# Patient Record
Sex: MALE | Race: WHITE | NOT HISPANIC OR LATINO | Employment: FULL TIME | URBAN - METROPOLITAN AREA
[De-identification: names, ages, dates, MRNs, and addresses within clinical notes are randomized per-mention and may not be internally consistent; named-entity substitution may affect disease eponyms.]

---

## 2017-01-12 ENCOUNTER — ALLSCRIPTS OFFICE VISIT (OUTPATIENT)
Dept: OTHER | Facility: OTHER | Age: 62
End: 2017-01-12

## 2017-01-14 LAB
A/G RATIO (HISTORICAL): 1.6 (ref 1.1–2.5)
ALBUMIN SERPL BCP-MCNC: 4.4 G/DL (ref 3.6–4.8)
ALP SERPL-CCNC: 72 IU/L (ref 39–117)
ALT SERPL W P-5'-P-CCNC: 36 IU/L (ref 0–44)
AST SERPL W P-5'-P-CCNC: 35 IU/L (ref 0–40)
BILIRUB SERPL-MCNC: 0.3 MG/DL (ref 0–1.2)
BUN SERPL-MCNC: 15 MG/DL (ref 8–27)
BUN/CREA RATIO (HISTORICAL): 17 (ref 10–22)
CALCIUM SERPL-MCNC: 9.4 MG/DL (ref 8.6–10.2)
CHLORIDE SERPL-SCNC: 100 MMOL/L (ref 96–106)
CHOLEST SERPL-MCNC: 254 MG/DL (ref 100–199)
CO2 SERPL-SCNC: 22 MMOL/L (ref 18–29)
CREAT SERPL-MCNC: 0.89 MG/DL (ref 0.76–1.27)
DEPRECATED RDW RBC AUTO: 13.4 % (ref 12.3–15.4)
EGFR AFRICAN AMERICAN (HISTORICAL): 106 ML/MIN/1.73
EGFR-AMERICAN CALC (HISTORICAL): 92 ML/MIN/1.73
GLUCOSE SERPL-MCNC: 90 MG/DL (ref 65–99)
HCT VFR BLD AUTO: 41.8 % (ref 37.5–51)
HDLC SERPL-MCNC: 33 MG/DL
HGB BLD-MCNC: 15 G/DL (ref 12.6–17.7)
LDLC SERPL CALC-MCNC: ABNORMAL MG/DL (ref 0–99)
MCH RBC QN AUTO: 33.8 PG (ref 26.6–33)
MCHC RBC AUTO-ENTMCNC: 35.9 G/DL (ref 31.5–35.7)
MCV RBC AUTO: 94 FL (ref 79–97)
POTASSIUM SERPL-SCNC: 4.5 MMOL/L (ref 3.5–5.2)
RBC (HISTORICAL): 4.44 X10E6/UL (ref 4.14–5.8)
SODIUM SERPL-SCNC: 140 MMOL/L (ref 134–144)
TOT. GLOBULIN, SERUM (HISTORICAL): 2.7 G/DL (ref 1.5–4.5)
TOTAL PROTEIN (HISTORICAL): 7.1 G/DL (ref 6–8.5)
TRIGL SERPL-MCNC: 677 MG/DL (ref 0–149)
VLDLC SERPL CALC-MCNC: ABNORMAL MG/DL (ref 5–40)
WBC # BLD AUTO: 7.2 X10E3/UL (ref 3.4–10.8)

## 2017-02-28 ENCOUNTER — GENERIC CONVERSION - ENCOUNTER (OUTPATIENT)
Dept: OTHER | Facility: OTHER | Age: 62
End: 2017-02-28

## 2018-01-02 ENCOUNTER — ALLSCRIPTS OFFICE VISIT (OUTPATIENT)
Dept: OTHER | Facility: OTHER | Age: 63
End: 2018-01-02

## 2018-01-03 NOTE — PROGRESS NOTES
Assessment   1  Acute maxillary sinusitis, recurrence not specified (461 0) (J01 00)   2  Left shoulder pain (719 41) (M25 512)    Plan   Acute maxillary sinusitis, recurrence not specified    · Azithromycin 250 MG Oral Tablet; TAKE AS DIRECTED PER PACKAGE    INSTRUCTIONS   · Promethazine-DM 6 25-15 MG/5ML Oral Syrup; TAKE 5 ML EVERY 4 TO 6 HOURS    AS NEEDED FOR COUGH  Left shoulder pain    · *1 - [facility]  *  Status: Active  Requested for:    23ZRQ4374  Care Summary provided  : Yes    Discussion/Summary      In summary then this is a 79-year-old male who presents today with what appears to be acute influenza of 5 days duration  We discussed that at this point Tamiflu is not likely to be of great affect  He does appear to be developing a left maxillary/ethmoidal sinusitis possibly is secondary infection  Going to give him some azithromycin to try  He is also given some promethazine DM and push fluids and rest  He is asked to call if symptoms are not improving in 2-3 days or sooner should they deteriorate  He agrees  He does have chronic left shoulder pain which had believe represents a left rotator cuff tendinitis/arthropathy We are going to ask Orthopedic surgery to see him in to evaluate this  He agrees  Chief Complaint   I have 5 days of cold  Flu like sx over the weekend  Green nasal d/c as well as sputum  Mild facial pressure  Coughing and hacking all night  Little wheeze  No N/V/D  Diminished appetite  No myalgias  Also my left shoulder continues to hurt since that time I had checked before  I have a hard time raising it overhead  History of Present Illness   HPI: The patient is a 79-year-old male who states that approximately 5 days ago he developed upper respiratory symptoms which developed into headaches muscle aches and coughing and hacking all night  He has had very little wheeze and no shortness of breath  No exertional chest pain  No edema   He has had no nausea vomiting or diarrhea  He does have somewhat diminished appetite though he continues with fluids and normal urination  He has had some green nasal discharge as well as expectorated green postnasal drip  His wife develops similar symptoms 2 days after he did  He also has a history of left rotator cuff injury and continues to have discomfort as well as diminished range of motion  Sinusitis (Brief): The patient is being seen for an initial evaluation of sinusitis  The sinusitis involves the maxillary sinuses and the ethmoid sinuses  The sinusitis is classified as acute  The patient is currently experiencing symptoms  facial pressure headache nasal congestion purulent rhinorrhea postnasal drainage      Associated symptoms:  fever,-- chills,-- nausea,-- sore throat-- and-- cough, but-- no ear fullness-- and-- no ear pressure  Review of Systems        Constitutional: chills-- and-- feeling tired  ENT: sore throat-- and-- nasal discharge, but-- no earache  Cardiovascular: no chest pain-- and-- no lower extremity edema  Respiratory: cough-- and-- wheezing, but-- no shortness of breath,-- no orthopnea-- and-- no PND  Gastrointestinal: no vomiting-- and-- no diarrhea  Neurological: headache, but-- no dizziness  Active Problems   1  Allergic rhinitis (477 9) (J30 9)   2  Colon cancer screening (V76 51) (Z12 11)   3  Colonoscopy (Fiberoptic) Screening   4  Dermatitis (692 9) (L30 9)   5  Erectile dysfunction of non-organic origin (302 72) (F52 21)   6  Hypertriglyceridemia (272 1) (E78 1)   7  Influenza vaccine needed (V04 81) (Z23)   8  Left shoulder pain (719 41) (M25 512)   9  Right Rotator Cuff Sprain (Capsule) (840 4)    Past Medical History   1  History of Acute bronchitis (466 0) (J20 9)   2  History of atopic dermatitis (V13 3) (Z87 2)   3  History of viral infection (V12 09) (Z86 19)    Social History    · Never A Smoker    Current Meds    1   Mometasone Furoate 0 1 % External Solution; APPLY ONLY TO SCALP LESIONS AND     RUB IN GENTLY AND COMPLETELY  PREVENT SOLUTION FROM SPREADING ONTO     FOREHEAD; Therapy: 51QKR6357 to (Evaluate:90Bwt6950)  Requested for: 72YIJ4132; Last     Rx:12Jan2017 Ordered   2  Viagra 100 MG Oral Tablet; TAKE 1 TABLET DAILY 1 HOUR BEFORE NEEDED; Therapy: 54NYN8571 to (Evaluate:24Jan2017)  Requested for: 12LTS9081; Last     Rx:12Jan2017 Ordered    Allergies   1  No Known Drug Allergies    Vitals    Recorded: 02Jan2018 11:43AM   Temperature 71 5 F   Systolic 691   Diastolic 72   Height 5 ft 10 5 in   Weight 196 lb 8 oz   BMI Calculated 27 8   BSA Calculated 2 08     Physical Exam        Constitutional      General appearance: Abnormal  -- Overweight and fatigued appearing  Eyes      Conjunctiva and lids: No swelling, erythema, or discharge  Ears, Nose, Mouth, and Throat      External inspection of ears and nose: Abnormal  -- Tender left maxilla ethmoidal region  Otoscopic examination: Tympanic membrance translucent with normal light reflex  Canals patent without erythema  Nasal mucosa, septum, and turbinates: Abnormal  -- Red and boggy with thick yellow-green nasal discharge on the left  Oropharynx: Normal with no erythema, edema, exudate or lesions  -- Negative ulcer exudate or lesion  Mucosa is moist       Cardiovascular      Auscultation of heart: Normal rate and rhythm, normal S1 and S2, without murmurs  -- Regular rhythm with a rate in the 70s  Examination of extremities for edema and/or varicosities: Normal  -- No edema  Lymphatic      Palpation of lymph nodes in neck: No lymphadenopathy  -- No JVD or adenopathy  Musculoskeletal      Digits and nails: Normal without clubbing or cyanosis  -- No clubbing cyanosis or edema  Inspection/palpation of joints, bones, and muscles: Abnormal  -- Cannot abduct left shoulder past 90Â°        Psychiatric      Orientation to person, place and time: Normal        Mood and affect: Normal           Signatures    Electronically signed by : Daun Spatz, M D ; Jan 2 2018 12:39PM EST                       (Author)

## 2018-01-10 DIAGNOSIS — M75.122 COMPLETE ROTATOR CUFF TEAR OF LEFT SHOULDER: ICD-10-CM

## 2018-01-10 DIAGNOSIS — M25.512 PAIN IN LEFT SHOULDER: ICD-10-CM

## 2018-01-10 NOTE — PROGRESS NOTES
Assessment    1  Encounter for preventive health examination (V70 0) (Z00 00)   2  Erectile dysfunction of non-organic origin (302 72) (F52 21)   3  Dermatitis (692 9) (L30 9)    Plan  Dermatitis    · Mometasone Furoate 0 1 % External Solution; APPLY ONLY TO SCALP  LESIONS AND RUB IN GENTLY AND COMPLETELY  PREVENT SOLUTION FROM  SPREADING ONTO FOREHEAD  Erectile dysfunction of non-organic origin    · Viagra 100 MG Oral Tablet; TAKE 1 TABLET DAILY 1 HOUR BEFORE NEEDED  Influenza vaccine needed    · Fluzone Quadrivalent 0 5 ML Intramuscular Suspension  Tick bite    · Doxycycline Hyclate 100 MG Oral Capsule    Discussion/Summary  Impression: health maintenance visit  Currently, he eats an adequate diet and has an inadequate exercise regimen  Prostate cancer screening: the risks and benefits of prostate cancer screening were discussed and the patient declines PSA testing  Testicular cancer screening: the risks and benefits of testicular cancer screening were discussed and clinical testicular exam was done today  Colorectal cancer screening: the risks and benefits of colorectal cancer screening were discussed and colonoscopy has been ordered  Screening lab work includes glucose and lipid profile  The risks and benefits of immunizations were discussed and He received a flu shot today but he declines DTaP  Advice and education were given regarding nutrition and aerobic exercise  Patient discussion: discussed with the patient  In summary then this is a 25-year-old male for health maintenance visit  He's not had blood work for many years  He did not have it performed after his last health maintenance visit as requested  Though he is not fasting today we decided to get CBC CMP and lipids at least as a baseline and can follow from there  He agrees  He received an influenza vaccine today but declines DTaP  His examination is essentially normal today  Blood pressure is well controlled   He is approximately 15 pounds above ideal weight and is asked to work on this   He agrees  We'll follow-up after blood work results are available  He's instructed that he needs colorectal screening  Mometasone lotion is refilled for dermatitis  Viagra is refilled for erectile dysfunction  Chief Complaint  My psoriasis comes and goes and I need a Viagra RF  History of Present Illness  HM, Adult Male: The patient is being seen for a health maintenance evaluation  Social History: Household members include spouse  He is   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  The patient has never smoked cigarettes  He reports drinking 1 drinks per day  He has never used illicit drugs  General Health: He has regular dental visits  He denies vision problems  Vision care includes wearing glasses  He has hearing loss  Immunizations status:  Needs Dtap  Lifestyle:  He consumes a diverse and healthy diet  He does not have any weight concerns  He exercises regularly  Exercise includes walking  He consumes alcohol  He denies drug use  Reproductive health:  He complains of erectile dysfunction  Needs Viagra RF  Screening: Prostate cancer screening includes no previous prostate-specific antigen testing  Colorectal cancer screening includes no previous screening  Review of Systems    Constitutional: no recent weight gain and no recent weight loss  Cardiovascular: no chest pain, no palpitations and no extremity edema  Respiratory: no shortness of breath, no cough, no wheezing and no shortness of breath during exertion  Gastrointestinal: no constipation, no diarrhea and no blood in stools  Genitourinary: no urinary hesitancy, no incontinence and no nocturia  Integumentary: a rash and Psoriasis, but no skin lesions  Neurological: no headache and no dizziness  Psychiatric: no anxiety and no depression  Active Problems    1  Allergic rhinitis (267 9) (J30 9)   2  Colonoscopy (Fiberoptic) Screening   3  Dermatitis (692 9) (L30 9)   4  Erectile dysfunction of non-organic origin (302 72) (F52 21)   5  Influenza vaccine needed (V04 81) (Z23)   6  Left shoulder pain (719 41) (M25 512)   7  Right Rotator Cuff Sprain (Capsule) (840 4)   8  Tick bite (919 4,E906 4) (W57 XXXA)    Past Medical History    · History of Acute bronchitis (466 0) (J20 9)   · History of atopic dermatitis (V13 3) (Z87 2)   · History of viral infection (V12 09) (Z86 19)    Social History    · Never A Smoker    Current Meds   1  Doxycycline Hyclate 100 MG Oral Capsule; TAKE 1 CAPSULE EVERY 12 HOURS UNTIL   GONE;   Therapy: 85YQW9804 to (Ruthy Whitman)  Requested for: 73MQI6756; Last   Rx:18Mar2016 Ordered   2  Mometasone Furoate 0 1 % External Solution; APPLY ONLY TO SCALP LESIONS AND   RUB IN GENTLY AND COMPLETELY  PREVENT SOLUTION FROM SPREADING ONTO   FOREHEAD; Therapy: 64JAO7552 to (IFKFFITU:45XOC2168)  Requested for: 54AUS2785; Last   Rx:03Oct2016 Ordered   3  Viagra 100 MG Oral Tablet; TAKE 1 TABLET DAILY 1 HOUR BEFORE NEEDED; Therapy: 90IEQ9762 to 0761 30 00 40)  Requested for: 09UCD9665; Last   Rx:64Dqi6953 Ordered    Allergies    1  No Known Drug Allergies    Vitals   Recorded: 62BGM0058 37:87BL   Systolic 788   Diastolic 62   Height 5 ft 10 5 in   Weight 200 lb    BMI Calculated 28 29   BSA Calculated 2 1     Physical Exam    Constitutional   General appearance: No acute distress, well appearing and well nourished  Eyes   Conjunctiva and lids: No erythema, swelling or discharge  Ears, Nose, Mouth, and Throat   Otoscopic examination: Tympanic membranes translucent with normal light reflex  Canals patent without erythema  Lips, teeth, and gums: Normal, good dentition  Oropharynx: Normal with no erythema, edema, exudate or lesions  Neck   Neck: Supple, symmetric, trachea midline, no masses  Thyroid: Normal, no thyromegaly      Pulmonary   Respiratory effort: No increased work of breathing or signs of respiratory distress  Auscultation of lungs: Clear to auscultation  Cardiovascular   Auscultation of heart: Normal rate and rhythm, normal S1 and S2, no murmurs  The heart rate was normal  The rhythm was regular  Heart sounds: normal S1, normal S2 and no gallop heard  no murmurs were heard  Carotid pulses: 2+ bilaterally  right 2+, no bruit heard over the right carotid, left 2+ and no bruit heard over the left carotid  Examination of extremities for edema and/or varicosities: Normal     Abdomen   Abdomen: Non-tender, no masses  Liver and spleen: No hepatomegaly or splenomegaly  Examination for hernias: No hernias appreciated  Anus, perineum, and rectum: Normal sphincter tone, no masses, no prolapse  Genitourinary   Scrotal contents: Normal testes, no masses  Scrotum findings: normal  Testes: no tenderness, no atrophy and no masses  Penis: Normal, no lesions  Digital rectal exam of prostate: Normal size, no masses  The prostate was not enlarged and had no palpable nodules  Lymphatic   Palpation of lymph nodes in neck: No lymphadenopathy  Skin   Skin and subcutaneous tissue: Normal without rashes or lesions      Psychiatric   Judgment and insight: Normal     Orientation to person, place and time: Normal     Mood and affect: Normal        Signatures   Electronically signed by : CARLITOS Cohen ; Jan 12 2017  6:11PM EST                       (Author)

## 2018-01-11 ENCOUNTER — APPOINTMENT (OUTPATIENT)
Dept: RADIOLOGY | Facility: CLINIC | Age: 63
End: 2018-01-11
Payer: COMMERCIAL

## 2018-01-11 ENCOUNTER — ALLSCRIPTS OFFICE VISIT (OUTPATIENT)
Dept: OTHER | Facility: OTHER | Age: 63
End: 2018-01-11

## 2018-01-11 DIAGNOSIS — M25.512 PAIN IN LEFT SHOULDER: ICD-10-CM

## 2018-01-11 PROCEDURE — 73030 X-RAY EXAM OF SHOULDER: CPT

## 2018-01-12 NOTE — PROGRESS NOTES
Assessment   1  Complete tear of left rotator cuff (725 47) (W54 585)    Plan   Complete tear of left rotator cuff    · Follow-up After MRI Evaluation and Treatment  Follow-up  Status: Hold For - Scheduling     Requested for: 68CTD4137   · * MRI SHOULDER LEFT WO CONTRAST; Status:Need Information - Financial    Authorization; Requested UF:[id number];    · *1 - [facility]  * evaluate and treat with aggressive    stretching, scapular stabilization and other appropriate modalities  No restrictions     Status: Complete  Done: 43UCJ1337  Care Summary provided  : Yes  Left shoulder pain    · * XR SHOULDER 2+ VIEW LEFT; Status:Active - Retrospective By Protocol Authorization; Requested NUB:[id number]; Discussion/Summary      60-year-old male with what appears to be a massive tear of the left rotator cuff  I would like to get an MRI to better assess the tear, the level of retraction the ability to fix it  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  We will also get him going with physical therapy to help with range of motion  I will see him back after the MRI  documentation was recorded using voice recognition software      Chief Complaint   Unable to use left shoulder and arm      History of Present Illness   60-year-old male here today for evaluation of his left shoulder  He has been having problems in the shoulder for the last 4-5 years, though over the last few weeks it is significantly worse  He has been given anti-inflammatories in the past which helped a little bit  The shoulder feels like it is stiff knee now up he has a lot more difficulty getting the hand up over his head  He now has to use his other arm to get the hand up above his head  If he showering he leans his elbow on the shower wall to wash his hair  Two weeks ago he was lifting his arm up and felt a pop sensation in the shoulder and since that time it has been worse   He has a constant aching pain in the shoulder  He has difficulty talking his shirt in an pulling his pants up  He notes weakness and instability  He has not done any therapy at this time  He has not had any injections  medical intake form was reviewed      Review of Systems        Constitutional: No fever or chills, feels well, no tiredness, no recent weight loss or weight gain  Eyes: No complaints of red eyes, no eyesight problems  ENT: no complaints of loss of hearing, no nosebleeds, no sore throat  Cardiovascular: No complaints of chest pain, no palpitations, no leg claudication or lower extremity edema  Respiratory: No complaints of shortness of breath, no wheezing, no cough  Gastrointestinal: No complaints of abdominal pain, no constipation, no nausea or vomiting, no diarrhea or bloody stools  Genitourinary: No complaints of dysuria or incontinence, no hesitancy, no nocturia  Musculoskeletal: limb pain, but-- as noted in HPI  Integumentary: No complaints of skin rash or lesion, no itching or dry skin, no skin wounds  Neurological: No complaints of headache, no confusion, no numbness or tingling, no dizziness  Psychiatric: No suicidal thoughts, no anxiety, no depression  Endocrine: No muscle weakness, no frequent urination, no excessive thirst, no feelings of weakness  ROS reviewed  Active Problems   1  Acute maxillary sinusitis, recurrence not specified (461 0) (J01 00)   2  Allergic rhinitis (477 9) (J30 9)   3  Colon cancer screening (V76 51) (Z12 11)   4  Colonoscopy (Fiberoptic) Screening   5  Dermatitis (692 9) (L30 9)   6  Erectile dysfunction of non-organic origin (302 72) (F52 21)   7  Hypertriglyceridemia (272 1) (E78 1)   8  Influenza vaccine needed (V04 81) (Z23)   9  Left shoulder pain (719 41) (M25 512)   10   Right Rotator Cuff Sprain (Capsule) (840 4)    Past Medical History    · History of Acute bronchitis (466 0) (J20 9)   · History of atopic dermatitis (V13 3) (Z87 2)   · History of viral infection (V12 09) (Z86 19)     The active problems and past medical history were reviewed and updated today  Surgical History      The surgical history was reviewed and updated today  Family History      The family history was reviewed and updated today  Social History    · Never A Smoker  The social history was reviewed and updated today  Current Meds    1  Azithromycin 250 MG Oral Tablet; TAKE AS DIRECTED PER PACKAGE     INSTRUCTIONS; Therapy: 60DZX2284 to (Last Rx:02Jan2018)  Requested for: 02Jan2018 Ordered   2  Mometasone Furoate 0 1 % External Solution; APPLY ONLY TO SCALP LESIONS AND     RUB IN GENTLY AND COMPLETELY  PREVENT SOLUTION FROM SPREADING ONTO     FOREHEAD; Therapy: 91QVA2976 to (Evaluate:66Vqe9931)  Requested for: 64FZN9678; Last     Rx:12Jan2017 Ordered   3  Promethazine-DM 6 25-15 MG/5ML Oral Syrup; TAKE 5 ML EVERY 4 TO 6 HOURS AS     NEEDED FOR COUGH; Therapy: 78BDA4374 to (Evaluate:06Jan2018)  Requested for: 52GEP7095; Last     Rx:02Jan2018 Ordered   4  Viagra 100 MG Oral Tablet; TAKE 1 TABLET DAILY 1 HOUR BEFORE NEEDED; Therapy: 78PVO1284 to (Evaluate:24Jan2017)  Requested for: 66IJC5597; Last     Rx:12Jan2017 Ordered     The medication list was reviewed and updated today  Allergies   1  No Known Drug Allergies    Vitals    Recorded: 37XNA2943 02:37PM   Heart Rate 84   Systolic 617   Diastolic 82   Height 5 ft 10 5 in   Weight 196 lb    BMI Calculated 27 73   BSA Calculated 2 08     Physical Exam      subacromial bursa Forward flexion: painful restricted AROM 80 degrees and restricted PROM 150 degrees which was painful  External rotation: restricted AROM 40 degrees  Motor: Unable to perform subscap testing because patient cannot was arm in that position, 4/5 strength with external rotation and internal rotation   Special Tests: positive Painful Arc-- and-- positive Drop Arm test, but-- negative Speed's test-- and-- negative Cross Body Adduction test  Internal rotation by spine level: restricted AROM Upper lumbar versus middle thoracic degrees  Left Shoulder Appearance[de-identified] Normal No atrophy noted of the supraspinatus or infraspinatus muscles  Tenderness: AC joint-- and-- greater tuberosity  Right Shoulder:      Inspection and Palpation: Normal       ROM: Normal       Strength: Normal       Stability: Normal       Constitutional - General appearance: Normal       Musculoskeletal - Gait and station: Normal -- Upper extremity compartments: Normal       Cardiovascular - Pulses: Normal       Skin - Skin and subcutaneous tissue: Normal       Neurologic - Sensation: Normal -- Upper extremity peripheral neuro exam: Normal       Psychiatric - Orientation to person, place, and time: Normal -- Mood and affect: Normal       Eyes      Conjunctiva and lids: Normal        Results/Data   I personally reviewed the films/images/results in the office today  My interpretation follows  X-ray Review X-rays show some mild acromial clavicular joint arthritis as well as glenohumeral osteoarthritis  The humeral head is slightly high riding concerning for rotator cuff tear        Signatures    Electronically signed by : Randell Marcelino MD; Jan 11 2018  3:23PM EST                       (Author)

## 2018-01-13 VITALS
HEIGHT: 71 IN | WEIGHT: 200 LBS | SYSTOLIC BLOOD PRESSURE: 124 MMHG | BODY MASS INDEX: 28 KG/M2 | DIASTOLIC BLOOD PRESSURE: 62 MMHG

## 2018-01-13 NOTE — RESULT NOTES
Verified Results  (1) COMPREHENSIVE METABOLIC PANEL 92HEG7044 69:92LG [facility] [place] [facility]     Test Name Result Flag Reference   Glucose, Serum 90 mg/dL  65-99   BUN 15 mg/dL  8-27   Creatinine, Serum 0 89 mg/dL  0 76-1 27   eGFR If NonAfricn Am 92 mL/min/1 73  >59   eGFR If Africn Am 106 mL/min/1 73  >59   BUN/Creatinine Ratio 17  10-22   Sodium, Serum 140 mmol/L  134-144   Potassium, Serum 4 5 mmol/L  3 5-5 2   Chloride, Serum 100 mmol/L     Carbon Dioxide, Total 22 mmol/L  18-29   Calcium, Serum 9 4 mg/dL  8 6-10 2   Protein, Total, Serum 7 1 g/dL  6 0-8 5   Albumin, Serum 4 4 g/dL  3 6-4 8   Globulin, Total 2 7 g/dL  1 5-4 5   A/G Ratio 1 6  1 1-2 5   Bilirubin, Total 0 3 mg/dL  0 0-1 2   Sample is lipemic  This may cause spurious increases in TBili, DBili,  AST, ALT, and UIBC (if ordered)  Clinical correlation indicated  Alkaline Phosphatase, S 72 IU/L     AST (SGOT) 35 IU/L  0-40   The specimen was lipemic  The lipemia was cleared by  ultracentrifugation before testing  However HDL, direct LDL,  cholesterol and triglyceride (if ordered) were performed prior to  ultracentrifugation  ALT (SGPT) 36 IU/L  0-44   The specimen was lipemic  The lipemia was cleared by  ultracentrifugation before testing  However HDL, direct LDL,  cholesterol and triglyceride (if ordered) were performed prior to  ultracentrifugation       (LC) CBC, No Differential/Platelet [id number] 53:79ZP [facility]     Test Name Result Flag Reference   WBC 7 2 x10E3/uL  3 4-10 8   RBC 4 44 x10E6/uL  4 14-5 80   Hemoglobin 15 0 g/dL  12 6-17 7   Hematocrit 41 8 %  37 5-51 0   MCV 94 fL  79-97   MCH 33 8 pg H 26 6-33 0   MCHC 35 9 g/dL H 31 5-35 7   RDW 13 4 %  12 3-15 4     () Lipid Panel 75RNP3076 12:00AM [facility]     Test Name Result Flag Reference   Cholesterol, Total 254 mg/dL H 100-199   Triglycerides 677 mg/dL HH 0-149   HDL Cholesterol 33 mg/dL L >39   VLDL Cholesterol [place] Comment mg/dL  5-40   The calculation for the VLDL cholesterol is not valid when  triglyceride level is >400 mg/dL  LDL Cholesterol Calc Comment mg/dL  0-99   Triglyceride result indicated is too high for an accurate LDL  cholesterol estimation  Plan  Hypertriglyceridemia    · (1) LIPID PANEL FASTING W DIRECT LDL REFLEX; Status:Active;  Requested  for:39Yeq6357;

## 2018-01-22 VITALS
HEART RATE: 84 BPM | WEIGHT: 196 LBS | DIASTOLIC BLOOD PRESSURE: 82 MMHG | HEIGHT: 71 IN | SYSTOLIC BLOOD PRESSURE: 128 MMHG | BODY MASS INDEX: 27.44 KG/M2

## 2018-01-23 VITALS
HEIGHT: 71 IN | DIASTOLIC BLOOD PRESSURE: 72 MMHG | SYSTOLIC BLOOD PRESSURE: 138 MMHG | BODY MASS INDEX: 27.51 KG/M2 | TEMPERATURE: 99.6 F | WEIGHT: 196.5 LBS

## 2018-01-26 ENCOUNTER — EVALUATION (OUTPATIENT)
Dept: PHYSICAL THERAPY | Facility: CLINIC | Age: 63
End: 2018-01-26
Payer: COMMERCIAL

## 2018-01-26 DIAGNOSIS — M75.122 COMPLETE TEAR OF LEFT ROTATOR CUFF: Primary | ICD-10-CM

## 2018-01-26 DIAGNOSIS — M75.122 COMPLETE ROTATOR CUFF TEAR OF LEFT SHOULDER: ICD-10-CM

## 2018-01-26 PROCEDURE — 97161 PT EVAL LOW COMPLEX 20 MIN: CPT

## 2018-01-26 PROCEDURE — 97140 MANUAL THERAPY 1/> REGIONS: CPT

## 2018-01-26 PROCEDURE — G8984 CARRY CURRENT STATUS: HCPCS

## 2018-01-26 PROCEDURE — G8985 CARRY GOAL STATUS: HCPCS

## 2018-01-26 RX ORDER — MOMETASONE FUROATE 1 MG/G
OINTMENT TOPICAL DAILY
COMMUNITY
End: 2019-06-24 | Stop reason: SDUPTHER

## 2018-01-26 RX ORDER — AZITHROMYCIN 250 MG/1
250 TABLET, FILM COATED ORAL EVERY 24 HOURS
COMMUNITY
End: 2018-05-24 | Stop reason: ALTCHOICE

## 2018-01-26 RX ORDER — SILDENAFIL 100 MG/1
100 TABLET, FILM COATED ORAL DAILY PRN
COMMUNITY
End: 2019-01-14 | Stop reason: ALTCHOICE

## 2018-01-26 RX ORDER — DEXTROMETHORPHAN HYDROBROMIDE AND PROMETHAZINE HYDROCHLORIDE 15; 6.25 MG/5ML; MG/5ML
SYRUP ORAL 4 TIMES DAILY PRN
COMMUNITY
End: 2018-05-24 | Stop reason: ALTCHOICE

## 2018-01-26 NOTE — PROGRESS NOTES
PT Evaluation     Today's date: 2018  Patient name: Adenike Acosta  : 1955  MRN: [id number]  Referring provider: Kasandra Vanegas MD  Dx:   Encounter Diagnosis   Name Primary?  Complete rotator cuff tear of left shoulder                   Assessment  Impairments: abnormal or restricted ROM, impaired physical strength and pain with function  Functional limitations: difficulty lifting equipment at shoulder level or overhead, difficulty dressing  Patient is irritable  Assessment details: Pt is a 60year old RHD male who presents to PT with complaints of chronic L shoulder pain, with suspicion of RC tear  He reports no pain at rest ,only mild ache with L arm use  He presents with severe weakness in RC musculature with decreased AROM and antalgic weakness on L side  He has a positive Drop Arm and positive Infraspinatus lag sign  He has moderate posterior and inferior capsule tightness in L shoulder compared to his R side  He will benefit from skilled PT to help decrease pain with daily activities, increase AROM and strength for L shoulder stabilizers and accessory musculature, and improve overall functioning  Thank you kindly for your referral    Barriers to therapy: high copay= $80  Understanding of Dx/Px/POC: excellent   Prognosis: fair  Prognosis details: Good pain tolerance, yet severe weakness in RC musculature, will require strict compliance with exercises for success  Goals  ST: Increase AROM elevation to 110-120 in 4 weeks  2:  Increase RC strength to 4 to 4+/5 throughout in 4-6 weeks  3: Decrease pain 2-3 grades on VAS in 4 weeks  LT: Pt able to lift 10-15 lbs overhead BL without pain/difficulty in 6 weeks  2: Pt independent with HEP      Plan  Patient would benefit from: skilled PTPlanned therapy interventions: manual therapy, joint mobilization, neuromuscular re-education, patient education, strengthening, therapeutic exercise, flexibility and home exercise program  Frequency: 1x week  Duration in visits: 6  Duration in weeks: 6  Treatment plan discussed with: patient  Plan details: Initiate PT as per POC        Subjective Evaluation    History of Present Illness  Date of onset: 2018  Mechanism of injury: Pain in shoulder for a couple years  Recently L shoulder pain radual onset, was in bed with flu, arm stiffened up from not moving for a week, saw PCP for sickness and addressed L shoulder    Saw Dr Kristi El, x-ray ordered, wants to follow up with MRI  Pain with dressing, lifting overhead  No sleep disturbance    Recurrent probem  Quality of life: good    Pain  Current pain ratin  At best pain ratin  At worst pain ratin  Location: anterior/lateral L shoulder  Quality: dull ache, throbbing and sharp  Relieving factors: rest  Aggravating factors: overhead activity  Progression: improved    Social Support    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, 7 days/week, other manual side jobs)  Hand dominance: right      Diagnostic Tests  X-ray: normal (mild degenerative changes to [facility] [place] joint)  Patient Goals  Patient goals for therapy: independence with ADLs/IADLs, decreased pain and increased motion          Objective     Palpation     Additional Palpation Details  No tenderness to palpation    Active Range of Motion   Left Shoulder   Flexion: 95 degrees   Abduction: 75 degrees   External rotation BTH: C6   Internal rotation BTB: L1     Right Shoulder   Flexion: 158 degrees   Abduction: 158 degrees   External rotation BTH: T1   Internal rotation BTB: T9     Additional Active Range of Motion Details  No significant pain, just tightness with L arm motion    Passive Range of Motion   Left Shoulder   Normal passive range of motion    Right Shoulder   Flexion: 160 degrees   Abduction: 145 degrees   External rotation 45°: 65 degrees   Internal rotation 45°: 30 degrees     Additional Passive Range of Motion Details  Significant posterior and inferior capsule tightness, no pain with joint mobilization    Strength/Myotome Testing     Left Shoulder     Planes of Motion   Flexion: 4-   Extension: 5   Abduction: 3+   External rotation at 0°: 3+   Internal rotation at 0°: 4+     Isolated Muscles   Biceps: 5   Triceps: 4+     Right Shoulder     Planes of Motion   Flexion: 4   Extension: 5   Abduction: 4   External rotation at 0°: 4-   Internal rotation at 0°: 4+     Isolated Muscles   Biceps: 5   Triceps: 4+     Additional Strength Details  L shoulder strength taken in available ranges    Tests     Left Shoulder   Positive drop arm, external rotation lag sign and painful arc  Negative crossover, Hawkin's and Neer's         Precautions: none    Daily Treatment Diary     Manual              L shoulder PROM             Post/inferior mob             Rhythmic stabs                                           Exercise Diary              Wand overhead 2# cuff             SH IR 3#             SA punches             Figure-8             SL ER             Prone extension             Prone abd                          TB row, ext             Standing scaption             Wall slides with lift-off                                                                                                                                      Modalities              CP post Loan 10 min

## 2018-01-31 ENCOUNTER — OFFICE VISIT (OUTPATIENT)
Dept: PHYSICAL THERAPY | Facility: CLINIC | Age: 63
End: 2018-01-31
Payer: COMMERCIAL

## 2018-01-31 DIAGNOSIS — M75.122 COMPLETE ROTATOR CUFF TEAR OF LEFT SHOULDER: Primary | ICD-10-CM

## 2018-01-31 DIAGNOSIS — M75.122 COMPLETE TEAR OF LEFT ROTATOR CUFF: ICD-10-CM

## 2018-01-31 PROCEDURE — 97110 THERAPEUTIC EXERCISES: CPT

## 2018-01-31 PROCEDURE — 97140 MANUAL THERAPY 1/> REGIONS: CPT

## 2018-01-31 NOTE — PROGRESS NOTES
Daily Note     Today's date: 2018  Patient name: Romulo Mendoza  : 1955  MRN: [id number]  Referring provider: Clay Frias MD  Dx:   Encounter Diagnoses   Name Primary?  Complete rotator cuff tear of left shoulder Yes    Complete tear of left rotator cuff                   Subjective: Pt reports no problems with isometrics from HEP, IR stretch with towel behind back is only one that makes it somewhat sore  Objective: See treatment diary below  Daily Treatment Diary     Manual              L shoulder PROM c            Post/inferior mob NP            Isometrics IR/ER in supine, scapular protraction/retracton in SL c                          20                Exercise Diary              Wand overhead 2# cuff 10x            SH IR 3# 2x10            SA punches 0# 2x10            Figure-8 0# 2x10            SL ER 0# 2x10            Prone extension NV            Prone abd NV                         TB row, ext BTB            Standing scaption             Wall slides with lift-off                                                                                                                      20                Modalities              CP post Loan 10 min                                               Assessment: Tolerated treatment well  Patient exhibited good technique with therapeutic exercises  Initiated program today  Pt had some pain at end ranges for passive stretching  Tolerated manual isometrics well, improved activation and strength of infraspinatus in neutral compared to IE      Plan: Continue per plan of care

## 2018-02-07 ENCOUNTER — OFFICE VISIT (OUTPATIENT)
Dept: PHYSICAL THERAPY | Facility: CLINIC | Age: 63
End: 2018-02-07
Payer: COMMERCIAL

## 2018-02-07 DIAGNOSIS — M75.122 COMPLETE ROTATOR CUFF TEAR OF LEFT SHOULDER: Primary | ICD-10-CM

## 2018-02-07 PROCEDURE — 97010 HOT OR COLD PACKS THERAPY: CPT

## 2018-02-07 PROCEDURE — 97140 MANUAL THERAPY 1/> REGIONS: CPT

## 2018-02-07 PROCEDURE — 97110 THERAPEUTIC EXERCISES: CPT

## 2018-02-07 NOTE — PROGRESS NOTES
Daily Note     Today's date: 2018  Patient name: Velasquez Posada  : 1955  MRN: [id number]  Referring provider: Romulo Pruitt MD  Dx:   Encounter Diagnosis   Name Primary?  Complete rotator cuff tear of left shoulder Yes                  Subjective: L shoulder feels achey and stiff  Still having trouble taking off sweatshirt      Objective: See treatment diary below  Daily Treatment Diary     Manual             L shoulder PROM c c           Post/inferior mob NP c           Isometrics IR/ER in supine, scapular protraction/retracton in SL c c                         20 20               Exercise Diary             Wand overhead 2# cuff 10x 15x           SH IR 2# 2x10 2x10           SA punches 0# 2x10 2x10           Figure-8 0# 2x10 2x10           SL ER 0# 2x10 2x10           Prone extension NV modified row and ext 2# 2x10           Prone abd NV NV                        TB row, ext BTB 2x10           Standing scaption  2x10           Wall slides with lift-off  10x           pulley -- 3 min                                                                                                       20 23               Modalities              CP post Loan 10 min  8 min                                         Assessment: Tolerated treatment well  Patient exhibited good technique with therapeutic exercises   Pt has gradual improvement in recruitment of Ers, mild muscle guarding today with manuals but Pt able to tolerate  Pt had some soreness after Loan, CP for 8 min      Plan: Continue per plan of care

## 2018-02-14 ENCOUNTER — APPOINTMENT (OUTPATIENT)
Dept: PHYSICAL THERAPY | Facility: CLINIC | Age: 63
End: 2018-02-14
Payer: COMMERCIAL

## 2018-02-21 ENCOUNTER — OFFICE VISIT (OUTPATIENT)
Dept: PHYSICAL THERAPY | Facility: CLINIC | Age: 63
End: 2018-02-21
Payer: COMMERCIAL

## 2018-02-21 DIAGNOSIS — M75.122 COMPLETE ROTATOR CUFF TEAR OF LEFT SHOULDER: Primary | ICD-10-CM

## 2018-02-21 DIAGNOSIS — M75.122 COMPLETE TEAR OF LEFT ROTATOR CUFF: ICD-10-CM

## 2018-02-21 PROCEDURE — 97140 MANUAL THERAPY 1/> REGIONS: CPT

## 2018-02-21 PROCEDURE — 97110 THERAPEUTIC EXERCISES: CPT

## 2018-02-21 NOTE — PROGRESS NOTES
Daily Note     Today's date: 2018  Patient name: Jinny Zhu  : 1955  MRN: [id number]  Referring provider: Mirna Hernandez MD  Dx:   Encounter Diagnosis     ICD-10-CM    1  Complete rotator cuff tear of left shoulder M75 122    2  Complete tear of left rotator cuff M75 122                   Subjective: Pt reports shoulder is feeling good  Only problem is getting arm overhead, feels like gears aren't lined up, usually has to throw arm up        Objective: See treatment diary below  Daily Treatment Diary     Manual            L shoulder PROM c c 10          Post/inferior mob NP c 5          Isometrics IR/ER in supine, scapular protraction/retracton in SL c c 5                        20 20 20              Exercise Diary            Wand overhead 2# cuff 10x 15x 15x          SH IR 2# 2x10 2x10 2x10          SA punches 0# 2x10 2x10 1# 2x10          Figure-8 0# 2x10 2x10 NP          SL ER 0# 2x10 2x10 1# 2x10          Prone extension NV modified row and ext 2# 2x10 4# 2x10          Prone abd NV NV                        TB row, ext BTB 2x10 2x10          Standing scaption  2x10 2x10          Wall slides with lift-off  10x 10x          pulley -- 3 min NP          Arm bike   3/3min          SL arm abd   10x          SL arm flex   10x                                                               20 23 20              Modalities              CP post Loan 10 min  8 min NP                                          Assessment: Tolerated treatment well  Patient exhibited good technique with therapeutic exercises  Pt has a gradual improvement seen in strength of ERs of L shoulder  Pt still having trouble raising L arm overhead in standing without pain  Added to pt's HEP to continue his strengthening at home  HEP: SA punches, SL Er, SL sh ABD, standing scaption, modified prone ext  Plan: Continue per plan of care  Progress note during next visit

## 2018-02-28 ENCOUNTER — OFFICE VISIT (OUTPATIENT)
Dept: PHYSICAL THERAPY | Facility: CLINIC | Age: 63
End: 2018-02-28
Payer: COMMERCIAL

## 2018-02-28 DIAGNOSIS — M75.122 COMPLETE ROTATOR CUFF TEAR OF LEFT SHOULDER: Primary | ICD-10-CM

## 2018-02-28 PROCEDURE — 97140 MANUAL THERAPY 1/> REGIONS: CPT

## 2018-02-28 PROCEDURE — G8984 CARRY CURRENT STATUS: HCPCS

## 2018-02-28 PROCEDURE — G8985 CARRY GOAL STATUS: HCPCS

## 2018-02-28 NOTE — PROGRESS NOTES
PT Re-Evaluation  and PT Discharge    Today's date: 3/13/2018  Patient name: Devonte Carrillo  : 1955  MRN: [id number]  Referring provider: Bridget Cantu MD  Dx:   Encounter Diagnosis   Name Primary?  Complete rotator cuff tear of left shoulder Yes       Start Time: 1800  Stop Time: 182Pt has not returned to therapy after previous re-evaluation  D/C to HEP at this time  Total time in clinic (min): 25 minutes    Assessment  Impairments: abnormal or restricted ROM, impaired physical strength and pain with function  Functional limitations: difficulty lifting equipment at shoulder level or overhead, able to perform activities but pain is till there  Patient is irritable  Assessment details: Pt has improved slightly with therapy  His AROM has improved since initial eval yet his strength measures are about the same despite progressive strengthening  He has been compliant with his home program and he has been using his L arm for his work duties when necessary  Recommend another 4 weeks of therapy to further his progress upon your discretion or possible further diagnostic imaging to determine severity of RC injury  Barriers to therapy: high copay= $80  Understanding of Dx/Px/POC: excellent   Prognosis: fair  Prognosis details: Good pain tolerance, yet severe weakness in RC musculature, will require strict compliance with exercises for success  Goals  ST: Increase AROM elevation to 110-120 in 4 weeks- met  2:  Increase RC strength to 4 to 4+/5 throughout in 4-6 weeks- not met  3: Decrease pain 2-3 grades on VAS in 4 weeks- not met  LT: Pt able to lift 10-15 lbs overhead BL without pain/difficulty in 6 weeks- not met  2: Pt independent with HEP- met  3: Improve FOTO 10 pts- not met      Plan  Patient would benefit from: skilled PT  Planned therapy interventions: manual therapy, joint mobilization, neuromuscular re-education, patient education, strengthening, therapeutic exercise, flexibility and home exercise program  Frequency: 1x week  Duration in visits: 4  Duration in weeks: 4  Treatment plan discussed with: patient  Plan details: Continue PT as per POC/physician's discretion        Subjective Evaluation    History of Present Illness  Date of onset: 2018  Mechanism of injury: Pain in shoulder for a couple years  Recently L shoulder pain radual onset, was in bed with flu, arm stiffened up from not moving for a week, saw PCP for sickness and addressed L shoulder    Saw Dr Lalito Ruiz, x-ray ordered, wants to follow up with MRI  Pain with dressing, lifting overhead  No sleep disturbance  Able to do majority of work requirements, but painful  Recurrent probem    Quality of life: good    Pain  Current pain ratin  At best pain ratin  At worst pain ratin (no change from IE)  Location: anterior/lateral L shoulder  Quality: dull ache, throbbing and sharp  Relieving factors: rest and ice  Aggravating factors: overhead activity and lifting  Progression: improved (very little, still having pain with use of L arm)    Social Support    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, 7 days/week, other manual side jobs)  Hand dominance: right      Diagnostic Tests  X-ray: normal (mild degenerative changes to [facility] joint)  Patient Goals  Patient goals for therapy: independence with ADLs/IADLs, decreased pain and increased motion          Objective     Active Range of Motion   Left Shoulder   Flexion: 125 degrees   Abduction: 90 degrees   External rotation BTH: C6   Internal rotation BTB: T12     Right Shoulder   Flexion: 158 degrees   Abduction: 158 degrees   External rotation BTH: T1   Internal rotation BTB: T9     Additional Active Range of Motion Details  Pain with abd AROM    Passive Range of Motion   Left Shoulder   Flexion: 160 degrees   Abduction: 145 degrees   External rotation 45°: 65 degrees   Internal rotation 45°: 40 degrees     Right Shoulder   Normal passive range of motion    Additional Passive Range of Motion Details  Significant posterior and inferior capsule tightness, no pain with joint mobilization    Strength/Myotome Testing     Left Shoulder     Planes of Motion   Flexion: 4-   Extension: 5   Abduction: 3+   External rotation at 0°: 4-   Internal rotation at 0°: 4+     Isolated Muscles   Biceps: 5   Triceps: 4+     Right Shoulder     Planes of Motion   Flexion: 4   Extension: 5   Abduction: 4   External rotation at 0°: 4   Internal rotation at 0°: 4+     Isolated Muscles   Biceps: 5   Triceps: 4+     Additional Strength Details  L shoulder strength taken in available ranges    Tests     Left Shoulder   Positive drop arm, external rotation lag sign and painful arc  Negative crossover, Hawkin's and Neer's         Precautions: none    Daily Treatment Diary     Manual  1/31 2/7 2/21 2/28         L shoulder PROM c c 10 10         Post/inferior mob NP c 5 5         Isometrics IR/ER in supine, scapular protraction/retracton in SL c c 5 10                       20 20 20 25             Exercise Diary  1/31 2/7 2/21 2/28         Wand overhead 2# cuff 10x 15x 15x NP         SH IR 2# 2x10 2x10 2x10 NP         SA punches 0# 2x10 2x10 1# 2x10 NP         Figure-8 0# 2x10 2x10 NP NP         SL ER 0# 2x10 2x10 1# 2x10 NP         Prone extension NV modified row and ext 2# 2x10 4# 2x10 NP         Prone abd NV NV                        TB row, ext BTB 2x10 2x10 NP         Standing scaption  2x10 2x10 NP         Wall slides with lift-off  10x 10x NP         pulley -- 3 min NP          Arm bike   3/3min NP         SL arm abd   10x NP         SL arm flex   10x NP                                                              20 23 20              Modalities              CP post Loan 10 min  8 min NP                                            HEP: stretching exercises, modified prone ext, scap retraction, TB row, ext, Ir, SL Er, SA punches, SL abd/flex, standing scaption      Flowsheet Rows    Flowsheet Row Most Recent Value PT/OT G-Codes   Current Score  55   Assessment Type  Re-evaluation   G code set  Carrying, Moving & Handling Objects   Carrying, Moving and Handling Objects Current Status ()  CK   Carrying, Moving and Handling Objects Goal Status ()  Mariah Griffin

## 2018-03-01 ENCOUNTER — OFFICE VISIT (OUTPATIENT)
Dept: OBGYN CLINIC | Facility: CLINIC | Age: 63
End: 2018-03-01
Payer: COMMERCIAL

## 2018-03-01 VITALS
HEART RATE: 67 BPM | SYSTOLIC BLOOD PRESSURE: 128 MMHG | DIASTOLIC BLOOD PRESSURE: 82 MMHG | BODY MASS INDEX: 28.86 KG/M2 | HEIGHT: 70 IN | WEIGHT: 201.6 LBS

## 2018-03-01 DIAGNOSIS — M75.122 COMPLETE TEAR OF LEFT ROTATOR CUFF: Primary | ICD-10-CM

## 2018-03-01 PROCEDURE — 99213 OFFICE O/P EST LOW 20 MIN: CPT | Performed by: ORTHOPAEDIC SURGERY

## 2018-03-01 NOTE — ASSESSMENT & PLAN NOTE
69-year-old male with a massive rotator cuff tear on examination  He has had some increased motion by working with physical therapy, but no increased strength  Recommendation is made for an MRI for preoperative surgical planning  I will see him back after the MRI

## 2018-03-01 NOTE — PROGRESS NOTES
Assessment:     1  Complete tear of left rotator cuff        Plan:     Problem List Items Addressed This Visit        Musculoskeletal and Integument    Complete tear of left rotator cuff - Primary     76-year-old male with a massive rotator cuff tear on examination  He has had some increased motion by working with physical therapy, but no increased strength  Recommendation is made for an MRI for preoperative surgical planning  I will see him back after the MRI  Relevant Orders    MRI shoulder left wo contrast         Subjective:     Patient ID: Terrance Spatz  is a 60 y o  male  Chief Complaint:  76-year-old male here today for follow-up of his left shoulder  He has been having problems in the shoulder for the last 4-5 years, though over the last few weeks it is significantly worse  One month ago he was lifting his arm up and felt a pop sensation in the shoulder and since that time it has been worse  He has a constant aching pain in the shoulder  He has difficulty talking his shirt in an pulling his pants up  He notes weakness and instability  He has worked with physical therapy for the last six weeks  He notes improvement overall in his motion, but still has significant amount of weakness  According to the physical therapy notes his overall strength is not improved at all despite strengthening exercises he has been doing daily  Allergy:  No Known Allergies  Medications:  all current active meds have been reviewed  Past Medical History:  Past Medical History:   Diagnosis Date    Acute maxillary sinusitis     Allergic rhinitis     Dermatitis     Erectile dysfunction of nonorganic origin     Hypertriglyceridemia     Sprain of right rotator cuff capsule      Past Surgical History:  History reviewed  No pertinent surgical history    Family History:  Family History   Problem Relation Age of Onset    No Known Problems Mother     No Known Problems Father      Social History:  History Alcohol Use    Yes     History   Drug Use No     History   Smoking Status    Never Smoker   Smokeless Tobacco    Never Used     Review of Systems   Constitutional: Negative  HENT: Negative  Eyes: Negative  Respiratory: Negative  Cardiovascular: Negative  Gastrointestinal: Negative  Endocrine: Negative  Genitourinary: Negative  Musculoskeletal: Positive for arthralgias  Skin: Negative  Allergic/Immunologic: Negative  Neurological: Negative  Hematological: Negative  Psychiatric/Behavioral: Negative  Objective:  BP Readings from Last 1 Encounters:   03/01/18 128/82      Wt Readings from Last 1 Encounters:   03/01/18 91 4 kg (201 lb 9 6 oz)      BMI:   Estimated body mass index is 28 93 kg/m² as calculated from the following:    Height as of this encounter: 5' 10" (1 778 m)  Weight as of this encounter: 91 4 kg (201 lb 9 6 oz)  BSA:   Estimated body surface area is 2 09 meters squared as calculated from the following:    Height as of this encounter: 5' 10" (1 778 m)  Weight as of this encounter: 91 4 kg (201 lb 9 6 oz)  Physical Exam  Left Shoulder Exam     Other   Erythema: absent  Sensation: normal  Pulse: present     Comments:  Patient has mild tenderness over the greater tuberosity of the shoulder, he has 4-/5 strength with supraspinatus testing, infraspinatus testing, and subscap testing  He has pain with resisted testing  He has a near positive drop-arm sign  He has very mild muscle atrophy of the supraspinatus and infraspinatus noted  Crepitus on shoulder range of motion  No apprehension   Positive Lee test   Negative cross-arm test

## 2018-03-22 ENCOUNTER — HOSPITAL ENCOUNTER (OUTPATIENT)
Dept: MRI IMAGING | Facility: HOSPITAL | Age: 63
Discharge: HOME | End: 2018-03-22
Attending: ORTHOPAEDIC SURGERY
Payer: COMMERCIAL

## 2018-03-22 DIAGNOSIS — M75.122 COMPLETE TEAR OF LEFT ROTATOR CUFF: ICD-10-CM

## 2018-03-22 PROCEDURE — 73221 MRI JOINT UPR EXTREM W/O DYE: CPT

## 2018-03-27 ENCOUNTER — OFFICE VISIT (OUTPATIENT)
Dept: OBGYN CLINIC | Facility: CLINIC | Age: 63
End: 2018-03-27
Payer: COMMERCIAL

## 2018-03-27 VITALS
DIASTOLIC BLOOD PRESSURE: 79 MMHG | HEART RATE: 69 BPM | SYSTOLIC BLOOD PRESSURE: 126 MMHG | WEIGHT: 201 LBS | BODY MASS INDEX: 28.77 KG/M2 | HEIGHT: 70 IN

## 2018-03-27 DIAGNOSIS — M75.122 COMPLETE TEAR OF LEFT ROTATOR CUFF: Primary | ICD-10-CM

## 2018-03-27 PROCEDURE — 99213 OFFICE O/P EST LOW 20 MIN: CPT | Performed by: ORTHOPAEDIC SURGERY

## 2018-03-27 NOTE — PROGRESS NOTES
Assessment:     1  Complete tear of left rotator cuff        Plan:     Problem List Items Addressed This Visit        Musculoskeletal and Integument    Complete tear of left rotator cuff - Primary     [de-identified] year old male with a massive left shoulder supraspinatus and subscap tear with a large acromial spur and subluxation of the biceps tendon  I reviewed the MRI with the patient and discussed the results  We discussed treatment options including surgical plan for repair with subacromial decompression and possible biceps tenotomy versus tenodesis  We discussed what to expect postoperatively  We discussed long-term ramifications if no intervention is made  He would like to go home talk about it with his wife  He was given handouts regarding rotator cuff tears and surgery for those  I will see him back as needed  Subjective:     Patient ID: Eagle Acevedo  is a 60 y o  male  Chief Complaint:  79-year-old male here today for follow-up of his left shoulder  He has been having problems in the shoulder for the last 4-5 years, though over the last few weeks it is significantly worse  One month ago he was lifting his arm up and felt a pop sensation in the shoulder and since that time it has been worse  He has a constant aching pain in the shoulder  He has difficulty talking his shirt in an pulling his pants up  He notes weakness and instability  He has worked with physical therapy for the last six weeks  He notes improvement overall in his motion, but still has significant amount of weakness  According to the physical therapy notes his overall strength is not improved at all despite strengthening exercises he has been doing daily  He feels positive about how his shoulder is progressing        Allergy:  No Known Allergies  Medications:  all current active meds have been reviewed  Past Medical History:  Past Medical History:   Diagnosis Date    Acute maxillary sinusitis     Allergic rhinitis     Dermatitis     Erectile dysfunction of nonorganic origin     Hypertriglyceridemia     Sprain of right rotator cuff capsule      Past Surgical History:  History reviewed  No pertinent surgical history  Family History:  Family History   Problem Relation Age of Onset    No Known Problems Mother     No Known Problems Father      Social History:  History   Alcohol Use    Yes     History   Drug Use No     History   Smoking Status    Never Smoker   Smokeless Tobacco    Never Used     Review of Systems   Constitutional: Negative  HENT: Negative  Eyes: Negative  Respiratory: Negative  Cardiovascular: Negative  Gastrointestinal: Negative  Endocrine: Negative  Genitourinary: Negative  Musculoskeletal: Negative for arthralgias  Skin: Negative  Allergic/Immunologic: Negative  Neurological: Negative  Hematological: Negative  Psychiatric/Behavioral: Negative  Objective:  BP Readings from Last 1 Encounters:   03/27/18 126/79      Wt Readings from Last 1 Encounters:   03/27/18 91 2 kg (201 lb)      BMI:   Estimated body mass index is 28 84 kg/m² as calculated from the following:    Height as of this encounter: 5' 10" (1 778 m)  Weight as of this encounter: 91 2 kg (201 lb)  BSA:   Estimated body surface area is 2 09 meters squared as calculated from the following:    Height as of this encounter: 5' 10" (1 778 m)  Weight as of this encounter: 91 2 kg (201 lb)  Physical Exam  Left Shoulder Exam     Range of Motion   Forward Flexion: normal     Other   Erythema: absent  Sensation: normal  Pulse: present     Comments:  Patient has mild tenderness over the greater tuberosity of the shoulder, he has 4-/5 strength with supraspinatus testing, infraspinatus testing, and subscap testing  He has pain with resisted testing  He has a near positive drop-arm sign  He has very mild muscle atrophy of the supraspinatus and infraspinatus noted   Crepitus on shoulder range of motion  No apprehension  Positive Lee test                 I reviewed the MRI findings myself  The MRI showed a full-thickness supraspinatus tear and a full-thickness subscap tear  Both had a traction close to the glenoid  There is no significant fatty atrophy in the muscles, but this supraspinatus was small  Biceps tendon was subluxed out of the groove  There is a large acromial spur

## 2018-03-27 NOTE — ASSESSMENT & PLAN NOTE
[de-identified] year old male with a massive left shoulder supraspinatus and subscap tear with a large acromial spur and subluxation of the biceps tendon  I reviewed the MRI with the patient and discussed the results  We discussed treatment options including surgical plan for repair with subacromial decompression and possible biceps tenotomy versus tenodesis  We discussed what to expect postoperatively  We discussed long-term ramifications if no intervention is made  He would like to go home talk about it with his wife  He was given handouts regarding rotator cuff tears and surgery for those  I will see him back as needed

## 2018-05-24 ENCOUNTER — OFFICE VISIT (OUTPATIENT)
Dept: FAMILY MEDICINE CLINIC | Facility: CLINIC | Age: 63
End: 2018-05-24
Payer: COMMERCIAL

## 2018-05-24 VITALS
HEIGHT: 70 IN | SYSTOLIC BLOOD PRESSURE: 130 MMHG | WEIGHT: 188 LBS | TEMPERATURE: 99.1 F | DIASTOLIC BLOOD PRESSURE: 90 MMHG | BODY MASS INDEX: 26.92 KG/M2 | HEART RATE: 79 BPM | OXYGEN SATURATION: 96 %

## 2018-05-24 DIAGNOSIS — Z12.11 ENCOUNTER FOR SCREENING COLONOSCOPY: ICD-10-CM

## 2018-05-24 DIAGNOSIS — E78.1 HYPERTRIGLYCERIDEMIA: ICD-10-CM

## 2018-05-24 DIAGNOSIS — R55 SYNCOPE, UNSPECIFIED SYNCOPE TYPE: Primary | ICD-10-CM

## 2018-05-24 PROBLEM — J01.00 ACUTE MAXILLARY SINUSITIS: Status: ACTIVE | Noted: 2018-01-02

## 2018-05-24 PROBLEM — J01.00 ACUTE MAXILLARY SINUSITIS: Status: RESOLVED | Noted: 2018-01-02 | Resolved: 2018-05-24

## 2018-05-24 PROCEDURE — 1036F TOBACCO NON-USER: CPT | Performed by: FAMILY MEDICINE

## 2018-05-24 PROCEDURE — 99214 OFFICE O/P EST MOD 30 MIN: CPT | Performed by: FAMILY MEDICINE

## 2018-05-24 NOTE — PATIENT INSTRUCTIONS
Please arrange appointment for your carotid Dopplers as well as with Cardiology  Please have your fasting blood work performed  We will follow up with you when these results are available

## 2018-05-24 NOTE — ASSESSMENT & PLAN NOTE
The patient has syncopal episode which was not accompanied by any cardiopulmonary symptomatology  It is possible that this was vasovagal/neurocardiogenic in etiology  I do believe that he should have carotid Dopplers as well as Cardiology evaluation for echo and stress test or as Cardiology feels appropriate  He agrees with this and will schedule the carotid Dopplers and also appointment with Cardiology for consideration of further testing

## 2018-05-24 NOTE — PROGRESS NOTES
Assessment/Plan:  Syncope  The patient has syncopal episode which was not accompanied by any cardiopulmonary symptomatology  It is possible that this was vasovagal/neurocardiogenic in etiology  I do believe that he should have carotid Dopplers as well as Cardiology evaluation for echo and stress test or as Cardiology feels appropriate  He agrees with this and will schedule the carotid Dopplers and also appointment with Cardiology for consideration of further testing  Hypertriglyceridemia  He needs fasting blood work to include lipid profile addition to CBC CMP and TSH  Diagnoses and all orders for this visit:    Syncope, unspecified syncope type  -     Ambulatory referral to Cardiology; Future  -     VAS carotid complete study; Future    Encounter for screening colonoscopy  -     Occult Blood, Fecal, IA; Future  -     Occult Blood, Fecal, IA    Hypertriglyceridemia  -     CBC; Future  -     Comprehensive metabolic panel; Future  -     Lipid panel; Future  -     CBC  -     Comprehensive metabolic panel  -     Lipid panel  -     TSH, 3rd generation with T4 reflex; Future  -     TSH, 3rd generation with T4 reflex          Subjective:   Chief Complaint   Patient presents with    Follow-up     pt her for a follow up from [facility] after he passed out  pt was admitted on 5/12 and he was discharged on 5/13  pt states he had low bp, dehydration  pt due for his routine fast bloodwork, however I'm not sure what they did at the hospital  pt would prefer the do the fit test instead of the colonoscopy  Patient ID: Jessika Tavares  is a 60 y o  male  I was at a party at my brothers house on Saturday  Working in an attic for 3 hours in the am, nothing to eat or drink prior to 3 beers between noon and 5  Went to party at 5 ate a lot and had another beer  Became syncopal  [place] dizzy and passed out in the rain  EMS took him to [facility]  Had ECG which was normal  Had " low BP "   88/40   Had troponin which was normal  5 pm Sunday afternoon saw a physician who discharged him  Told dehydration  No sweaty or nausea  No Cp, rapid HB or other sx  No stress, echo, etc  I still feel a little run down  Still working  HPI  The patient is a 58-year-old male whose medical history is significant for hypertriglyceridemia was admitted to [facility] on 05/13  He states that the previous week was very busy at work working long days and irregular intake of food and liquid  On Saturday morning 513 he went straight to a job in worked for 3 hr without eating any food or drinking any liquids  He arrived home around noon and had 3 beers over the course of the next 5 hr  He had no food  He went to a party at his brother's house around 5 where he again had some or alcohol and had a large plate of food  Shortly thereafter he was standing in the driveway looking at a vehicle in the rain when he suddenly felt unwell and became syncopal   He denies any cardiac irregularity chest pain or shortness of breath prior to the event  No headache or focal neurologic symptom  Other people to party called 911 and he was transported to [facility]  Unfortunately we have no records other than a portable chest x-ray that appears normal   He states that he had normal ECG and believes he had cardiac blood work which was also normal   He was told he had low blood pressure and recalls 88/40  He states that he was taken to his room at 4:00 a m  and monitored through the next morning and afternoon  At 5:00 p m  a physician discharged him told him he thought he was dehydrated and that he needed to drink more fluids  They asked him to arrange for follow-up with his primary care as well as cardiologist for further evaluation  Since being discharged on the 14th he has had no recurrence of his symptoms    He has had no chest pain, rapid heartbeat, shortness of breath, headache dizziness or other focal neurologic symptoms  He has had no history of stress test echocardiogram or other cardiac testing  We do note that he had hypertriglyceridemia noted approximately 18 months ago it has had no follow-up to this since that time  The following portions of the patient's history were reviewed and updated as appropriate: allergies, current medications, past family history, past medical history, past social history and problem list     Review of Systems   Constitution: Negative for decreased appetite, fever, malaise/fatigue, night sweats, weight gain and weight loss  HENT: Negative  Eyes: Negative for double vision  Cardiovascular: Positive for syncope  Negative for chest pain, claudication, dyspnea on exertion, irregular heartbeat, leg swelling, near-syncope, orthopnea, palpitations and paroxysmal nocturnal dyspnea  Respiratory: Negative for cough, shortness of breath, snoring and wheezing  Gastrointestinal: Negative for bowel incontinence  Genitourinary: Negative for bladder incontinence  Neurological: Negative for dizziness, focal weakness, headaches and light-headedness  Psychiatric/Behavioral: Negative for depression and suicidal ideas  The patient does not have insomnia and is not nervous/anxious  Objective:    Physical Exam   Constitutional: He is oriented to person, place, and time  He appears well-developed and well-nourished  No distress  HENT:   Head: Normocephalic and atraumatic  Eyes: Pupils are equal, round, and reactive to light  No scleral icterus  Neck: Neck supple  No JVD present  No thyromegaly present  Cardiovascular: Normal rate, regular rhythm and normal heart sounds  Exam reveals no gallop  No murmur heard  Pulmonary/Chest: Effort normal and breath sounds normal  No respiratory distress  He has no wheezes  He has no rales  Abdominal: Soft  Bowel sounds are normal  He exhibits no mass  There is no tenderness  Musculoskeletal: He exhibits no edema  Neurological: He is alert and oriented to person, place, and time  He displays normal reflexes  No cranial nerve deficit  Coordination normal    Skin: No rash noted  No erythema  Psychiatric: He has a normal mood and affect   Thought content normal

## 2018-05-31 DIAGNOSIS — E78.1 HYPERTRIGLYCERIDEMIA: Primary | ICD-10-CM

## 2018-05-31 DIAGNOSIS — Z13.0 SCREENING FOR IRON DEFICIENCY ANEMIA: ICD-10-CM

## 2018-05-31 DIAGNOSIS — Z13.29 SCREENING FOR THYROID DISORDER: ICD-10-CM

## 2018-05-31 DIAGNOSIS — Z13.1 SCREENING FOR DIABETES MELLITUS: ICD-10-CM

## 2018-06-02 LAB
ALBUMIN SERPL-MCNC: 4.2 G/DL (ref 3.6–4.8)
ALBUMIN/GLOB SERPL: 1.6 {RATIO} (ref 1.2–2.2)
ALP SERPL-CCNC: 61 IU/L (ref 39–117)
ALT SERPL-CCNC: 26 IU/L (ref 0–44)
AST SERPL-CCNC: 28 IU/L (ref 0–40)
BILIRUB SERPL-MCNC: 0.6 MG/DL (ref 0–1.2)
BUN SERPL-MCNC: 17 MG/DL (ref 8–27)
BUN/CREAT SERPL: 15 (ref 10–24)
CALCIUM SERPL-MCNC: 9.2 MG/DL (ref 8.6–10.2)
CHLORIDE SERPL-SCNC: 99 MMOL/L (ref 96–106)
CHOLEST SERPL-MCNC: 228 MG/DL (ref 100–199)
CHOLEST/HDLC SERPL: 4.2 RATIO (ref 0–5)
CO2 SERPL-SCNC: 22 MMOL/L (ref 18–29)
CREAT SERPL-MCNC: 1.13 MG/DL (ref 0.76–1.27)
ERYTHROCYTE [DISTWIDTH] IN BLOOD BY AUTOMATED COUNT: 13.6 % (ref 12.3–15.4)
GLOBULIN SER-MCNC: 2.6 G/DL (ref 1.5–4.5)
GLUCOSE SERPL-MCNC: 79 MG/DL (ref 65–99)
HCT VFR BLD AUTO: 41.9 % (ref 37.5–51)
HDLC SERPL-MCNC: 54 MG/DL
HGB BLD-MCNC: 14.3 G/DL (ref 13–17.7)
LDLC SERPL CALC-MCNC: 151 MG/DL (ref 0–99)
MCH RBC QN AUTO: 32.7 PG (ref 26.6–33)
MCHC RBC AUTO-ENTMCNC: 34.1 G/DL (ref 31.5–35.7)
MCV RBC AUTO: 96 FL (ref 79–97)
PLATELET # BLD AUTO: 190 X10E3/UL (ref 150–379)
POTASSIUM SERPL-SCNC: 4.3 MMOL/L (ref 3.5–5.2)
PROT SERPL-MCNC: 6.8 G/DL (ref 6–8.5)
RBC # BLD AUTO: 4.37 X10E6/UL (ref 4.14–5.8)
SL AMB EGFR AFRICAN AMERICAN: 80 ML/MIN/1.73
SL AMB EGFR NON AFRICAN AMERICAN: 69 ML/MIN/1.73
SL AMB VLDL CHOLESTEROL CALC: 23 MG/DL (ref 5–40)
SODIUM SERPL-SCNC: 138 MMOL/L (ref 134–144)
TRIGL SERPL-MCNC: 117 MG/DL (ref 0–149)
TSH SERPL DL<=0.005 MIU/L-ACNC: 3.65 UIU/ML (ref 0.45–4.5)
WBC # BLD AUTO: 6.9 X10E3/UL (ref 3.4–10.8)

## 2018-08-03 ENCOUNTER — OFFICE VISIT (OUTPATIENT)
Dept: CARDIOLOGY CLINIC | Facility: CLINIC | Age: 63
End: 2018-08-03
Payer: COMMERCIAL

## 2018-08-03 VITALS
HEIGHT: 70 IN | BODY MASS INDEX: 27.83 KG/M2 | WEIGHT: 194.4 LBS | SYSTOLIC BLOOD PRESSURE: 110 MMHG | HEART RATE: 63 BPM | DIASTOLIC BLOOD PRESSURE: 70 MMHG

## 2018-08-03 DIAGNOSIS — R55 SYNCOPE, UNSPECIFIED SYNCOPE TYPE: Primary | ICD-10-CM

## 2018-08-03 PROCEDURE — 99243 OFF/OP CNSLTJ NEW/EST LOW 30: CPT | Performed by: INTERNAL MEDICINE

## 2018-08-03 PROCEDURE — 93000 ELECTROCARDIOGRAM COMPLETE: CPT | Performed by: INTERNAL MEDICINE

## 2018-08-03 NOTE — PROGRESS NOTES
Consultation - Cardiology   Berhane Worrell  60 y o  male MRN: [id number]    Encounter: [id number]    Assessment/Plan     Assessment:   Syncope    Plan:    His episode of syncope was probably vasovagal in etiology  His resting EKG is normal  Will order echocardiogram for completeness  Discussed importance of adequate hydration  History of Present Illness   Physician Requesting Consult: No att  providers found  Reason for Consult / Principal Problem: Syncope  HPI: Berhane Worrell  is a 60y o  year old male who presents with an episode of syncope  He drank a few beers, was outside most of the day  Then after eating a meal he lost consciousness  He had a prodrome of not feeling well prior to the episode  It has not happened again  He went to the ER at [facility]  He was discharged the following day  Observation was unremarkable  No arrhythmia on telemetry  He has no prior history of CAD, CHF or arrhythmia  He currently has no chest pain, no dyspnea and no palpitations  Family Hx: Mother unknown  Father was a regular smoker and passed away in his early [de-identified]  Review of Systems   Constitution: Negative  HENT: Negative  Eyes: Negative  Cardiovascular: Positive for syncope  Respiratory: Negative  Endocrine: Negative  Hematologic/Lymphatic: Negative  Skin: Negative  Musculoskeletal: Negative  Gastrointestinal: Negative  Genitourinary: Negative  Neurological: Negative for dizziness  Psychiatric/Behavioral: Negative  Allergic/Immunologic: Negative  Historical Information   Past Medical History:   Diagnosis Date    Acute maxillary sinusitis     Allergic rhinitis     Dermatitis     Erectile dysfunction of nonorganic origin     Hypertriglyceridemia     Sprain of right rotator cuff capsule      No past surgical history on file      Social History:  History   Alcohol Use    Yes     History   Drug Use No     History   Smoking Status    Never Smoker   Smokeless Tobacco    Never Used       Family History:   Family History   Problem Relation Age of Onset    No Known Problems Mother     No Known Problems Father        Meds/Allergies   No Known Allergies    Current Outpatient Prescriptions:     mometasone (ELOCON) 0 1 % ointment, Apply topically daily, Disp: , Rfl:     sildenafil (VIAGRA) 100 mg tablet, Take 100 mg by mouth daily as needed for erectile dysfunction, Disp: , Rfl:     Vitals:   Pulse: 63  Blood Pressue: 110/70  Weight: 88 2 kg (194 lb 6 4 oz)      Physical Exam   Constitutional: He is oriented to person, place, and time  No distress  HENT:   Mouth/Throat: No oropharyngeal exudate  Eyes: No scleral icterus  Neck: No JVD present  Cardiovascular: Normal rate and regular rhythm  No murmur heard  Pulmonary/Chest: Effort normal and breath sounds normal  No respiratory distress  He has no wheezes  He has no rales  Abdominal: Soft  Bowel sounds are normal  He exhibits no distension  There is no tenderness  There is no rebound  Musculoskeletal: He exhibits no edema  Neurological: He is alert and oriented to person, place, and time  Skin: Skin is warm and dry  He is not diaphoretic  Psychiatric: He has a normal mood and affect  His behavior is normal        [unfilled]    Invasive Devices          No matching active lines, drains, or airways          Lab Results   Component Value Date     01/13/2017     01/13/2017    CO2 22 01/13/2017    BUN 17 06/01/2018    CREATININE 1 13 06/01/2018    GLUCOSE 90 01/13/2017    CALCIUM 9 4 01/13/2017    AST 35 01/13/2017    ALT 36 01/13/2017    ALKPHOS 72 01/13/2017    PROT 7 1 01/13/2017    BILITOT 0 3 01/13/2017     Lab Results   Component Value Date    WBC 7 2 01/13/2017    HGB 14 3 06/01/2018     06/01/2018     No components found for: TROP    Imaging:     EKG: Normal Sinus Rhythm  PVCs  Counseling / Coordination of Care  Total floor / unit time spent today 45 minutes    Greater than 50% of total time was spent with the patient and / or family counseling and / or coordination of care  A description of the counseling / coordination of care

## 2018-08-29 ENCOUNTER — HOSPITAL ENCOUNTER (OUTPATIENT)
Dept: NON INVASIVE DIAGNOSTICS | Facility: CLINIC | Age: 63
Discharge: HOME | End: 2018-08-29
Payer: COMMERCIAL

## 2018-08-29 DIAGNOSIS — R55 SYNCOPE, UNSPECIFIED SYNCOPE TYPE: ICD-10-CM

## 2018-08-29 PROCEDURE — 93306 TTE W/DOPPLER COMPLETE: CPT | Performed by: INTERNAL MEDICINE

## 2018-08-29 PROCEDURE — 93306 TTE W/DOPPLER COMPLETE: CPT

## 2019-01-14 ENCOUNTER — OFFICE VISIT (OUTPATIENT)
Dept: FAMILY MEDICINE CLINIC | Facility: CLINIC | Age: 64
End: 2019-01-14
Payer: COMMERCIAL

## 2019-01-14 VITALS
HEIGHT: 70 IN | TEMPERATURE: 98.9 F | OXYGEN SATURATION: 98 % | SYSTOLIC BLOOD PRESSURE: 115 MMHG | WEIGHT: 193 LBS | BODY MASS INDEX: 27.63 KG/M2 | HEART RATE: 83 BPM | DIASTOLIC BLOOD PRESSURE: 70 MMHG

## 2019-01-14 DIAGNOSIS — M54.50 ACUTE MIDLINE LOW BACK PAIN WITHOUT SCIATICA: ICD-10-CM

## 2019-01-14 DIAGNOSIS — J20.8 ACUTE BRONCHITIS DUE TO OTHER SPECIFIED ORGANISMS: Primary | ICD-10-CM

## 2019-01-14 PROBLEM — R55 SYNCOPE: Status: RESOLVED | Noted: 2018-05-24 | Resolved: 2019-01-14

## 2019-01-14 PROCEDURE — 99214 OFFICE O/P EST MOD 30 MIN: CPT | Performed by: FAMILY MEDICINE

## 2019-01-14 PROCEDURE — 1036F TOBACCO NON-USER: CPT | Performed by: FAMILY MEDICINE

## 2019-01-14 PROCEDURE — 3008F BODY MASS INDEX DOCD: CPT | Performed by: FAMILY MEDICINE

## 2019-01-14 RX ORDER — ALBUTEROL SULFATE 90 UG/1
2 AEROSOL, METERED RESPIRATORY (INHALATION) EVERY 6 HOURS PRN
Qty: 8.5 G | Refills: 0 | Status: SHIPPED | OUTPATIENT
Start: 2019-01-14 | End: 2020-05-28 | Stop reason: ALTCHOICE

## 2019-01-14 RX ORDER — PREDNISONE 10 MG/1
10 TABLET ORAL DAILY
Qty: 22 TABLET | Refills: 0 | Status: SHIPPED | OUTPATIENT
Start: 2019-01-14 | End: 2020-05-28 | Stop reason: ALTCHOICE

## 2019-01-14 NOTE — ASSESSMENT & PLAN NOTE
He is going to use ice and stretching exercises  He has a prednisone taper  He is asked to call 1 week if his symptoms have not resolved  Will call sooner if he develops any worrisome symptoms which we discussed

## 2019-01-14 NOTE — PROGRESS NOTES
Assessment/Plan:  Acute bronchitis due to other specified organisms  Patient has acute respiratory illness  Appears to represent acute bronchitis  I see no evidence of pneumonitis  No evidence of CHF X cetera  We are going to give him a prednisone taper as well as a ProAir inhaler to be used as needed  He is asked push fluids and rest     Acute midline low back pain without sciatica  He is going to use ice and stretching exercises  He has a prednisone taper  He is asked to call 1 week if his symptoms have not resolved  Will call sooner if he develops any worrisome symptoms which we discussed  Diagnoses and all orders for this visit:    Acute bronchitis due to other specified organisms  -     albuterol (PROAIR HFA) 90 mcg/act inhaler; Inhale 2 puffs every 6 (six) hours as needed for wheezing  -     predniSONE 10 mg tablet; Take 1 tablet (10 mg total) by mouth daily 4 daily for 2 days then 3 daily for 2 days then 2 daily for 2 days then 1 daily for 4 days    Acute midline low back pain without sciatica          Subjective:   Chief Complaint   Patient presents with    Cough     productived and dark, wheezing   I have had a nagging cold for several days  Thick purulent nasal d/c  Wheezing like hell and worse overnight  Taking OTCs which give some relief  No heme or currant jelly look  No HA or myalgias  No facial pressure and no otalgia  Patient ID: Demetrio Taylor  is a 58 y o  male  HPI  Patient is a 53-year-old male who presents today for a respiratory illness which has been going on for several days  He states that he started out with cold-like symptoms with thick purulent nasal discharge  This is beginning to clear  States I am wheezing like hell  He notes that it is worse over night  He has been taking some over-the-counter medication which gives him some relief  He has had no hemoptysis, no current jelly like sputum    His sputum is white and slightly yellow tinged at times and again seems to be improving  He had no headache myalgias or fever when this started  Had no facial pressure and no otalgia  No rash no lightheadedness dizziness PND orthopnea edema X cetera  He has had some back pain  He has had no incontinence of bowel or bladder no weakness of his lower extremities  Coughing seems to exacerbate it  The following portions of the patient's history were reviewed and updated as appropriate: allergies, current medications, past family history, past medical history, past social history, past surgical history and problem list     Review of Systems   Constitution: Positive for malaise/fatigue  Negative for chills, decreased appetite, fever, weight gain and weight loss  HENT: Positive for congestion  Negative for sore throat  Respiratory: Positive for cough, sleep disturbances due to breathing, sputum production and wheezing  Negative for hemoptysis and shortness of breath  Skin: Negative for rash  Musculoskeletal: Positive for back pain  Negative for myalgias  Gastrointestinal: Negative for bowel incontinence  Genitourinary: Negative for bladder incontinence and flank pain  Neurological: Negative for dizziness and headaches  Objective:    Physical Exam   Constitutional: He is oriented to person, place, and time  He appears well-developed and well-nourished  No distress  HENT:   Mouth/Throat: Oropharynx is clear and moist  No oropharyngeal exudate  Mucosa is moist without ulcer exudate or lesion  TMs appear normal    Eyes: Conjunctivae are normal  Right eye exhibits no discharge  Left eye exhibits no discharge  No scleral icterus  Neck: Neck supple  No JVD present  No thyromegaly present  Cardiovascular: Normal rate, regular rhythm and normal heart sounds  Exam reveals no gallop  No murmur heard  Pulmonary/Chest: Effort normal  No respiratory distress  He has no wheezes  He has no rales     He has some mild delay of his expiratory phase but no crackle rhonchi or wheeze  Musculoskeletal: He exhibits tenderness  He exhibits no edema or deformity  Mild paralumbar tenderness  No step-off or deformity  Lymphadenopathy:     He has no cervical adenopathy  Neurological: He is alert and oriented to person, place, and time  He displays normal reflexes  He exhibits normal muscle tone  Skin: No rash noted  Psychiatric: He has a normal mood and affect  Thought content normal    Vitals reviewed

## 2019-01-14 NOTE — ASSESSMENT & PLAN NOTE
Patient has acute respiratory illness  Appears to represent acute bronchitis  I see no evidence of pneumonitis  No evidence of CHF X cetera  We are going to give him a prednisone taper as well as a ProAir inhaler to be used as needed    He is asked push fluids and rest

## 2019-06-24 DIAGNOSIS — L30.9 DERMATITIS: Primary | ICD-10-CM

## 2019-06-24 RX ORDER — MOMETASONE FUROATE 1 MG/G
OINTMENT TOPICAL DAILY
Qty: 45 G | Refills: 0 | Status: SHIPPED | OUTPATIENT
Start: 2019-06-24 | End: 2019-10-07

## 2019-10-07 DIAGNOSIS — L30.9 DERMATITIS: Primary | ICD-10-CM

## 2019-10-07 RX ORDER — MOMETASONE FUROATE 1 MG/ML
SOLUTION TOPICAL DAILY
Qty: 60 ML | Refills: 0 | Status: SHIPPED | OUTPATIENT
Start: 2019-10-07 | End: 2019-12-12 | Stop reason: SDUPTHER

## 2019-12-12 DIAGNOSIS — L30.9 DERMATITIS: ICD-10-CM

## 2019-12-12 RX ORDER — MOMETASONE FUROATE 1 MG/ML
SOLUTION TOPICAL DAILY
Qty: 60 ML | Refills: 0 | Status: SHIPPED | OUTPATIENT
Start: 2019-12-12

## 2020-05-28 ENCOUNTER — OFFICE VISIT (OUTPATIENT)
Dept: FAMILY MEDICINE CLINIC | Facility: CLINIC | Age: 65
End: 2020-05-28
Payer: MEDICARE

## 2020-05-28 VITALS
TEMPERATURE: 98 F | HEART RATE: 67 BPM | HEIGHT: 69 IN | DIASTOLIC BLOOD PRESSURE: 82 MMHG | WEIGHT: 186 LBS | OXYGEN SATURATION: 96 % | SYSTOLIC BLOOD PRESSURE: 128 MMHG | BODY MASS INDEX: 27.55 KG/M2

## 2020-05-28 DIAGNOSIS — F52.21 ERECTILE DYSFUNCTION OF NON-ORGANIC ORIGIN: Primary | ICD-10-CM

## 2020-05-28 PROBLEM — J20.8 ACUTE BRONCHITIS DUE TO OTHER SPECIFIED ORGANISMS: Status: RESOLVED | Noted: 2019-01-14 | Resolved: 2020-05-28

## 2020-05-28 PROCEDURE — 3008F BODY MASS INDEX DOCD: CPT | Performed by: FAMILY MEDICINE

## 2020-05-28 PROCEDURE — 1036F TOBACCO NON-USER: CPT | Performed by: FAMILY MEDICINE

## 2020-05-28 PROCEDURE — 99213 OFFICE O/P EST LOW 20 MIN: CPT | Performed by: FAMILY MEDICINE

## 2020-05-28 RX ORDER — SILDENAFIL 100 MG/1
100 TABLET, FILM COATED ORAL DAILY PRN
Qty: 10 TABLET | Refills: 5 | Status: SHIPPED | OUTPATIENT
Start: 2020-05-28 | End: 2021-10-12 | Stop reason: SDUPTHER

## 2020-07-16 ENCOUNTER — OFFICE VISIT (OUTPATIENT)
Dept: FAMILY MEDICINE CLINIC | Facility: CLINIC | Age: 65
End: 2020-07-16
Payer: MEDICARE

## 2020-07-16 VITALS
SYSTOLIC BLOOD PRESSURE: 130 MMHG | HEIGHT: 69 IN | OXYGEN SATURATION: 97 % | DIASTOLIC BLOOD PRESSURE: 76 MMHG | HEART RATE: 60 BPM | BODY MASS INDEX: 27.85 KG/M2 | TEMPERATURE: 96.9 F | WEIGHT: 188 LBS

## 2020-07-16 DIAGNOSIS — Z00.00 WELCOME TO MEDICARE PREVENTIVE VISIT: ICD-10-CM

## 2020-07-16 DIAGNOSIS — Z13.0 SCREENING FOR ENDOCRINE, NUTRITIONAL, METABOLIC AND IMMUNITY DISORDER: ICD-10-CM

## 2020-07-16 DIAGNOSIS — Z13.29 SCREENING FOR ENDOCRINE, NUTRITIONAL, METABOLIC AND IMMUNITY DISORDER: ICD-10-CM

## 2020-07-16 DIAGNOSIS — Z13.228 SCREENING FOR ENDOCRINE, NUTRITIONAL, METABOLIC AND IMMUNITY DISORDER: ICD-10-CM

## 2020-07-16 DIAGNOSIS — Z13.0 SCREENING FOR IRON DEFICIENCY ANEMIA: ICD-10-CM

## 2020-07-16 DIAGNOSIS — E78.1 HYPERTRIGLYCERIDEMIA: ICD-10-CM

## 2020-07-16 DIAGNOSIS — Z13.21 SCREENING FOR ENDOCRINE, NUTRITIONAL, METABOLIC AND IMMUNITY DISORDER: ICD-10-CM

## 2020-07-16 DIAGNOSIS — Z11.59 NEED FOR HEPATITIS C SCREENING TEST: Primary | ICD-10-CM

## 2020-07-16 PROCEDURE — NC001 PR NO CHARGE: Performed by: FAMILY MEDICINE

## 2020-07-16 PROCEDURE — G0403 EKG FOR INITIAL PREVENT EXAM: HCPCS | Performed by: FAMILY MEDICINE

## 2020-07-16 PROCEDURE — G0402 INITIAL PREVENTIVE EXAM: HCPCS | Performed by: FAMILY MEDICINE

## 2020-07-16 PROCEDURE — 1123F ACP DISCUSS/DSCN MKR DOCD: CPT | Performed by: FAMILY MEDICINE

## 2020-07-16 PROCEDURE — 3008F BODY MASS INDEX DOCD: CPT | Performed by: FAMILY MEDICINE

## 2020-07-16 NOTE — ASSESSMENT & PLAN NOTE
The patient presents today for a Welcome to Medicare visit  All components of the examination were addressed in completed  He did have an ECG today which revealed a incomplete right bundle branch block but no other abnormality  He has no history of hypertension  As far as gaps in care ago, he has no history of colorectal cancer screening  He had either a fit test or Cologuard in the past but did not return it  He agrees to return to Cologuard if we send it  We recommended pneumococcal vaccination to the patient  He is going to consider whether he would like to receive this vaccination and let us know when he comes for influenza vaccination this fall which he is leaning toward  We discussed healthy lifestyle  We discussed 5 wishes as he has no advanced directive presently  Did obtain fasting blood work and will follow up with him when results of his studies are available    He agrees with this plan

## 2020-07-16 NOTE — PATIENT INSTRUCTIONS
Medicare Preventive Visit Patient Instructions  Thank you for completing your Welcome to Medicare Visit or Medicare Annual Wellness Visit today  Your next wellness visit will be due in one year (7/16/2021)  The screening/preventive services that you may require over the next 5-10 years are detailed below  Some tests may not apply to you based off risk factors and/or age  Screening tests ordered at today's visit but not completed yet may show as past due  Also, please note that scanned in results may not display below  Preventive Screenings:  Service Recommendations Previous Testing/Comments   Colorectal Cancer Screening  · Colonoscopy    · Fecal Occult Blood Test (FOBT)/Fecal Immunochemical Test (FIT)  · Fecal DNA/Cologuard Test  · Flexible Sigmoidoscopy Age: 54-65 years old   Colonoscopy: every 10 years (May be performed more frequently if at higher risk)  OR  FOBT/FIT: every 1 year  OR  Cologuard: every 3 years  OR  Sigmoidoscopy: every 5 years  Screening may be recommended earlier than age 48 if at higher risk for colorectal cancer  Also, an individualized decision between you and your healthcare provider will decide whether screening between the ages of 74-80 would be appropriate   Colonoscopy: Not on file  FOBT/FIT: Not on file  Cologuard: Not on file  Sigmoidoscopy: Not on file         Prostate Cancer Screening Individualized decision between patient and health care provider in men between ages of 53-78   Medicare will cover every 12 months beginning on the day after your 50th birthday PSA: No results in last 5 years          Hepatitis C Screening Once for adults born between [facility]  More frequently in patients at high risk for Hepatitis C Hep C Antibody: Not on file       Diabetes Screening 1-2 times per year if you're at risk for diabetes or have pre-diabetes Fasting glucose: No results in last 5 years   A1C: No results in last 5 years       Cholesterol Screening Once every 5 years if you don't have a lipid disorder  May order more often based on risk factors  Lipid panel: 06/01/2018    Screening Not Indicated  History Lipid Disorder      Other Preventive Screenings Covered by Medicare:  1  Abdominal Aortic Aneurysm (AAA) Screening: covered once if your at risk  You're considered to be at risk if you have a family history of AAA or a male between the age of 73-68 who smoking at least 100 cigarettes in your lifetime  2  Lung Cancer Screening: covers low dose CT scan once per year if you meet all of the following conditions: (1) Age 50-69; (2) No signs or symptoms of lung cancer; (3) Current smoker or have quit smoking within the last 15 years; (4) You have a tobacco smoking history of at least 30 pack years (packs per day x number of years you smoked); (5) You get a written order from a healthcare provider  3  Glaucoma Screening: covered annually if you're considered high risk: (1) You have diabetes OR (2) Family history of glaucoma OR (3)  aged 48 and older OR (3)  American aged 72 and older  3  Osteoporosis Screening: covered every 2 years if you meet one of the following conditions: (1) Have a vertebral abnormality; (2) On glucocorticoid therapy for more than 3 months; (3) Have primary hyperparathyroidism; (4) On osteoporosis medications and need to assess response to drug therapy  5  HIV Screening: covered annually if you're between the age of 12-76  Also covered annually if you are younger than 13 and older than 72 with risk factors for HIV infection  For pregnant patients, it is covered up to 3 times per pregnancy      Immunizations:  Immunization Recommendations   Influenza Vaccine Annual influenza vaccination during flu season is recommended for all persons aged >= 6 months who do not have contraindications   Pneumococcal Vaccine (Prevnar and Pneumovax)  * Prevnar = PCV13  * Pneumovax = PPSV23 Adults 25-60 years old: 1-3 doses may be recommended based on certain risk factors  Adults 72 years old: Prevnar (PCV13) vaccine recommended followed by Pneumovax (PPSV23) vaccine  If already received PPSV23 since turning 65, then PCV13 recommended at least one year after PPSV23 dose  Hepatitis B Vaccine 3 dose series if at intermediate or high risk (ex: diabetes, end stage renal disease, liver disease)   Tetanus (Td) Vaccine - COST NOT COVERED BY MEDICARE PART B Following completion of primary series, a booster dose should be given every 10 years to maintain immunity against tetanus  Td may also be given as tetanus wound prophylaxis  Tdap Vaccine - COST NOT COVERED BY MEDICARE PART B Recommended at least once for all adults  For pregnant patients, recommended with each pregnancy  Shingles Vaccine (Shingrix) - COST NOT COVERED BY MEDICARE PART B  2 shot series recommended in those aged 48 and above     Health Maintenance Due:      Topic Date Due    Hepatitis C Screening  1955    CRC Screening: Colonoscopy  1955     Immunizations Due:      Topic Date Due    DTaP,Tdap,and Td Vaccines (1 - Tdap) 08/20/1965    Pneumococcal Vaccine: 65+ Years (1 of 2 - PCV13) 08/20/2019    Influenza Vaccine  07/01/2020     Advance Directives   What are advance directives? Advance directives are legal documents that state your wishes and plans for medical care  These plans are made ahead of time in case you lose your ability to make decisions for yourself  Advance directives can apply to any medical decision, such as the treatments you want, and if you want to donate organs  What are the types of advance directives? There are many types of advance directives, and each state has rules about how to use them  You may choose a combination of any of the following:  · Living will: This is a written record of the treatment you want  You can also choose which treatments you do not want, which to limit, and which to stop at a certain time   This includes surgery, medicine, IV fluid, and tube feedings  · Durable power of  for healthcare [place] [facility]): This is a written record that states who you want to make healthcare choices for you when you are unable to make them for yourself  This person, called a proxy, is usually a family member or a friend  You may choose more than 1 proxy  · Do not resuscitate (DNR) order:  A DNR order is used in case your heart stops beating or you stop breathing  It is a request not to have certain forms of treatment, such as CPR  A DNR order may be included in other types of advance directives  · Medical directive: This covers the care that you want if you are in a coma, near death, or unable to make decisions for yourself  You can list the treatments you want for each condition  Treatment may include pain medicine, surgery, blood transfusions, dialysis, IV or tube feedings, and a ventilator (breathing machine)  · Values history: This document has questions about your views, beliefs, and how you feel and think about life  This information can help others choose the care that you would choose  Why are advance directives important? An advance directive helps you control your care  Although spoken wishes may be used, it is better to have your wishes written down  Spoken wishes can be misunderstood, or not followed  Treatments may be given even if you do not want them  An advance directive may make it easier for your family to make difficult choices about your care  Weight Management   Why it is important to manage your weight:  Being overweight increases your risk of health conditions such as heart disease, high blood pressure, type 2 diabetes, and certain types of cancer  It can also increase your risk for osteoarthritis, sleep apnea, and other respiratory problems  Aim for a slow, steady weight loss  Even a small amount of weight loss can lower your risk of health problems    How to lose weight safely:  A safe and healthy way to lose weight is to eat fewer calories and get regular exercise  You can lose up about 1 pound a week by decreasing the number of calories you eat by 500 calories each day  Healthy meal plan for weight management:  A healthy meal plan includes a variety of foods, contains fewer calories, and helps you stay healthy  A healthy meal plan includes the following:  · Eat whole-grain foods more often  A healthy meal plan should contain fiber  Fiber is the part of grains, fruits, and vegetables that is not broken down by your body  Whole-grain foods are healthy and provide extra fiber in your diet  Some examples of whole-grain foods are whole-wheat breads and pastas, oatmeal, brown rice, and bulgur  · Eat a variety of vegetables every day  Include dark, leafy greens such as spinach, kale, kasi greens, and mustard greens  Eat yellow and orange vegetables such as carrots, sweet potatoes, and winter squash  · Eat a variety of fruits every day  Choose fresh or canned fruit (canned in its own juice or light syrup) instead of juice  Fruit juice has very little or no fiber  · Eat low-fat dairy foods  Drink fat-free (skim) milk or 1% milk  Eat fat-free yogurt and low-fat cottage cheese  Try low-fat cheeses such as mozzarella and other reduced-fat cheeses  · Choose meat and other protein foods that are low in fat  Choose beans or other legumes such as split peas or lentils  Choose fish, skinless poultry (chicken or turkey), or lean cuts of red meat (beef or pork)  Before you cook meat or poultry, cut off any visible fat  · Use less fat and oil  Try baking foods instead of frying them  Add less fat, such as margarine, sour cream, regular salad dressing and mayonnaise to foods  Eat fewer high-fat foods  Some examples of high-fat foods include french fries, doughnuts, ice cream, and cakes  · Eat fewer sweets  Limit foods and drinks that are high in sugar  This includes candy, cookies, regular soda, and sweetened drinks    Exercise:  Exercise at least 30 minutes per day on most days of the week  Some examples of exercise include walking, biking, dancing, and swimming  You can also fit in more physical activity by taking the stairs instead of the elevator or parking farther away from stores  Ask your healthcare provider about the best exercise plan for you  © Copyright [employer] 2018 Information is for End User's use only and may not be sold, redistributed or otherwise used for commercial purposes  All illustrations and images included in CareNotes® are the copyrighted property of [employer]  or [facility] Preventive Visit Patient Instructions  Thank you for completing your Welcome to Medicare Visit or Medicare Annual Wellness Visit today  Your next wellness visit will be due in one year (7/16/2021)  The screening/preventive services that you may require over the next 5-10 years are detailed below  Some tests may not apply to you based off risk factors and/or age  Screening tests ordered at today's visit but not completed yet may show as past due  Also, please note that scanned in results may not display below  Preventive Screenings:  Service Recommendations Previous Testing/Comments   Colorectal Cancer Screening  · Colonoscopy    · Fecal Occult Blood Test (FOBT)/Fecal Immunochemical Test (FIT)  · Fecal DNA/Cologuard Test  · Flexible Sigmoidoscopy Age: 54-65 years old   Colonoscopy: every 10 years (May be performed more frequently if at higher risk)  OR  FOBT/FIT: every 1 year  OR  Cologuard: every 3 years  OR  Sigmoidoscopy: every 5 years  Screening may be recommended earlier than age 48 if at higher risk for colorectal cancer  Also, an individualized decision between you and your healthcare provider will decide whether screening between the ages of 74-80 would be appropriate   Colonoscopy: Not on file  FOBT/FIT: Not on file  Cologuard: Not on file  Sigmoidoscopy: Not on file    Risks and Benefits Discussed     Prostate Cancer Screening Individualized decision between patient and health care provider in men between ages of 53-78   Medicare will cover every 12 months beginning on the day after your 50th birthday PSA: No results in last 5 years     Risks and Benefits Discussed  Patient Declines  Screening Not Indicated     Hepatitis C Screening Once for adults born between [birth year] and 1965  More frequently in patients at high risk for Hepatitis C Hep C Antibody: Not on file    Risks and Benefits Discussed   Diabetes Screening 1-2 times per year if you're at risk for diabetes or have pre-diabetes Fasting glucose: No results in last 5 years   A1C: No results in last 5 years    Risks and Benefits Discussed   Cholesterol Screening Once every 5 years if you don't have a lipid disorder  May order more often based on risk factors  Lipid panel: 06/01/2018    Screening Not Indicated  History Lipid Disorder      Other Preventive Screenings Covered by Medicare:  6  Abdominal Aortic Aneurysm (AAA) Screening: covered once if your at risk  You're considered to be at risk if you have a family history of AAA or a male between the age of 73-68 who smoking at least 100 cigarettes in your lifetime  7  Lung Cancer Screening: covers low dose CT scan once per year if you meet all of the following conditions: (1) Age 50-69; (2) No signs or symptoms of lung cancer; (3) Current smoker or have quit smoking within the last 15 years; (4) You have a tobacco smoking history of at least 30 pack years (packs per day x number of years you smoked); (5) You get a written order from a healthcare provider  8  Glaucoma Screening: covered annually if you're considered high risk: (1) You have diabetes OR (2) Family history of glaucoma OR (3)  aged 48 and older OR (3)  American aged 72 and older  5   Osteoporosis Screening: covered every 2 years if you meet one of the following conditions: (1) Have a vertebral abnormality; (2) On glucocorticoid therapy for more than 3 months; (3) Have primary hyperparathyroidism; (4) On osteoporosis medications and need to assess response to drug therapy  10  HIV Screening: covered annually if you're between the age of 12-76  Also covered annually if you are younger than 13 and older than 72 with risk factors for HIV infection  For pregnant patients, it is covered up to 3 times per pregnancy  Immunizations:  Immunization Recommendations   Influenza Vaccine Annual influenza vaccination during flu season is recommended for all persons aged >= 6 months who do not have contraindications   Pneumococcal Vaccine (Prevnar and Pneumovax)  * Prevnar = PCV13  * Pneumovax = PPSV23 Adults 25-60 years old: 1-3 doses may be recommended based on certain risk factors  Adults 72 years old: Prevnar (PCV13) vaccine recommended followed by Pneumovax (PPSV23) vaccine  If already received PPSV23 since turning 65, then PCV13 recommended at least one year after PPSV23 dose  Hepatitis B Vaccine 3 dose series if at intermediate or high risk (ex: diabetes, end stage renal disease, liver disease)   Tetanus (Td) Vaccine - COST NOT COVERED BY MEDICARE PART B Following completion of primary series, a booster dose should be given every 10 years to maintain immunity against tetanus  Td may also be given as tetanus wound prophylaxis  Tdap Vaccine - COST NOT COVERED BY MEDICARE PART B Recommended at least once for all adults  For pregnant patients, recommended with each pregnancy     Shingles Vaccine (Shingrix) - COST NOT COVERED BY MEDICARE PART B  2 shot series recommended in those aged 48 and above     Health Maintenance Due:      Topic Date Due    Hepatitis C Screening  1955    CRC Screening: Colonoscopy  1955     Immunizations Due:      Topic Date Due    DTaP,Tdap,and Td Vaccines (1 - Tdap) 08/20/1965    Pneumococcal Vaccine: 65+ Years (1 of 2 - PCV13) 08/20/2019    Influenza Vaccine  07/01/2020     Advance Directives   What are advance directives? Advance directives are legal documents that state your wishes and plans for medical care  These plans are made ahead of time in case you lose your ability to make decisions for yourself  Advance directives can apply to any medical decision, such as the treatments you want, and if you want to donate organs  What are the types of advance directives? There are many types of advance directives, and each state has rules about how to use them  You may choose a combination of any of the following:  · Living will: This is a written record of the treatment you want  You can also choose which treatments you do not want, which to limit, and which to stop at a certain time  This includes surgery, medicine, IV fluid, and tube feedings  · Durable power of  for healthcare [facility] [place]): This is a written record that states who you want to make healthcare choices for you when you are unable to make them for yourself  This person, called a proxy, is usually a family member or a friend  You may choose more than 1 proxy  · Do not resuscitate (DNR) order:  A DNR order is used in case your heart stops beating or you stop breathing  It is a request not to have certain forms of treatment, such as CPR  A DNR order may be included in other types of advance directives  · Medical directive: This covers the care that you want if you are in a coma, near death, or unable to make decisions for yourself  You can list the treatments you want for each condition  Treatment may include pain medicine, surgery, blood transfusions, dialysis, IV or tube feedings, and a ventilator (breathing machine)  · Values history: This document has questions about your views, beliefs, and how you feel and think about life  This information can help others choose the care that you would choose  Why are advance directives important? An advance directive helps you control your care   Although spoken wishes may be used, it is better to have your wishes written down  Spoken wishes can be misunderstood, or not followed  Treatments may be given even if you do not want them  An advance directive may make it easier for your family to make difficult choices about your care  Weight Management   Why it is important to manage your weight:  Being overweight increases your risk of health conditions such as heart disease, high blood pressure, type 2 diabetes, and certain types of cancer  It can also increase your risk for osteoarthritis, sleep apnea, and other respiratory problems  Aim for a slow, steady weight loss  Even a small amount of weight loss can lower your risk of health problems  How to lose weight safely:  A safe and healthy way to lose weight is to eat fewer calories and get regular exercise  You can lose up about 1 pound a week by decreasing the number of calories you eat by 500 calories each day  Healthy meal plan for weight management:  A healthy meal plan includes a variety of foods, contains fewer calories, and helps you stay healthy  A healthy meal plan includes the following:  · Eat whole-grain foods more often  A healthy meal plan should contain fiber  Fiber is the part of grains, fruits, and vegetables that is not broken down by your body  Whole-grain foods are healthy and provide extra fiber in your diet  Some examples of whole-grain foods are whole-wheat breads and pastas, oatmeal, brown rice, and bulgur  · Eat a variety of vegetables every day  Include dark, leafy greens such as spinach, kale, kasi greens, and mustard greens  Eat yellow and orange vegetables such as carrots, sweet potatoes, and winter squash  · Eat a variety of fruits every day  Choose fresh or canned fruit (canned in its own juice or light syrup) instead of juice  Fruit juice has very little or no fiber  · Eat low-fat dairy foods  Drink fat-free (skim) milk or 1% milk  Eat fat-free yogurt and low-fat cottage cheese   Try low-fat cheeses such as mozzarella and other reduced-fat cheeses  · Choose meat and other protein foods that are low in fat  Choose beans or other legumes such as split peas or lentils  Choose fish, skinless poultry (chicken or turkey), or lean cuts of red meat (beef or pork)  Before you cook meat or poultry, cut off any visible fat  · Use less fat and oil  Try baking foods instead of frying them  Add less fat, such as margarine, sour cream, regular salad dressing and mayonnaise to foods  Eat fewer high-fat foods  Some examples of high-fat foods include french fries, doughnuts, ice cream, and cakes  · Eat fewer sweets  Limit foods and drinks that are high in sugar  This includes candy, cookies, regular soda, and sweetened drinks  Exercise:  Exercise at least 30 minutes per day on most days of the week  Some examples of exercise include walking, biking, dancing, and swimming  You can also fit in more physical activity by taking the stairs instead of the elevator or parking farther away from stores  Ask your healthcare provider about the best exercise plan for you  © Copyright [employer] 2018 Information is for End User's use only and may not be sold, redistributed or otherwise used for commercial purposes   All illustrations and images included in CareNotes® are the copyrighted property of [employer]  or [address] [place] [address] [employer]

## 2020-07-16 NOTE — PROGRESS NOTES
Assessment and Plan:     Problem List Items Addressed This Visit        Other    Welcome to Medicare preventive visit     The patient presents today for a [place] to Medicare visit  All components of the examination were addressed in completed  He did have an ECG today which revealed a incomplete right bundle branch block but no other abnormality  He has no history of hypertension  As far as gaps in care ago, he has no history of colorectal cancer screening  He had either a fit test or Cologuard in the past but did not return it  He agrees to return to Cologuard if we send it  We recommended pneumococcal vaccination to the patient  He is going to consider whether he would like to receive this vaccination and let us know when he comes for influenza vaccination this fall which he is leaning toward  We discussed healthy lifestyle  We discussed 5 wishes as he has no advanced directive presently  Did obtain fasting blood work and will follow up with him when results of his studies are available  He agrees with this plan         Hypertriglyceridemia    Relevant Orders    Lipid Panel with Direct LDL reflex      Other Visit Diagnoses     Need for hepatitis C screening test    -  Primary    Relevant Orders    Hepatitis C Ab W/Refl To HCV RNA, Qn, PCR    Screening for endocrine, nutritional, metabolic and immunity disorder        Relevant Orders    Comprehensive metabolic panel    Screening for iron deficiency anemia        Relevant Orders    CBC        BMI Counseling: Body mass index is 27 97 kg/m²  The BMI is above normal  Nutrition recommendations include decreasing portion sizes, encouraging healthy choices of fruits and vegetables, consuming healthier snacks and moderation in carbohydrate intake  Exercise recommendations include moderate physical activity 150 minutes/week and exercising 3-5 times per week  No pharmacotherapy was ordered           Preventive health issues were discussed with patient, and age appropriate screening tests were ordered as noted in patient's After Visit Summary  Personalized health advice and appropriate referrals for health education or preventive services given if needed, as noted in patient's After Visit Summary  History of Present Illness:     Patient presents for Welcome to Medicare visit  Patient Care Team:  Tomas Diaz MD as PCP - General (Family Medicine)     Review of Systems:     Review of Systems   Problem List:     Patient Active Problem List   Diagnosis    Complete tear of left rotator cuff    Allergic rhinitis    Dermatitis    Erectile dysfunction of non-organic origin    Hypertriglyceridemia    Sprain of rotator cuff capsule    Acute midline low back pain without sciatica    Welcome to Medicare preventive visit      Past Medical and Surgical History:     Past Medical History:   Diagnosis Date    Acute maxillary sinusitis     Allergic rhinitis     Dermatitis     Erectile dysfunction of nonorganic origin     Hypertriglyceridemia     Sprain of right rotator cuff capsule     Syncope 5/24/2018     No past surgical history on file     Family History:     Family History   Problem Relation Age of Onset    No Known Problems Mother     No Known Problems Father       Social History:     E-Cigarette/Vaping    E-Cigarette Use Never User      E-Cigarette/Vaping Substances    Nicotine No     THC No     CBD No     Flavoring No     Other No     Unknown No      Social History     Socioeconomic History    Marital status: /Civil Union     Spouse name: None    Number of children: None    Years of education: None    Highest education level: None   Occupational History    None   Social Needs    Financial resource strain: None    Food insecurity:     Worry: None     Inability: None    Transportation needs:     Medical: None     Non-medical: None   Tobacco Use    Smoking status: Never Smoker    Smokeless tobacco: Never Used   Substance and Sexual Activity    Alcohol use: Yes     Frequency: 4 or more times a week    Drug use: No    Sexual activity: None   Lifestyle    Physical activity:     Days per week: None     Minutes per session: None    Stress: None   Relationships    Social connections:     Talks on phone: None     Gets together: None     Attends Jew service: None     Active member of club or organization: None     Attends meetings of clubs or organizations: None     Relationship status: None    Intimate partner violence:     Fear of current or ex partner: None     Emotionally abused: None     Physically abused: None     Forced sexual activity: None   Other Topics Concern    None   Social History Narrative    None      Medications and Allergies:     Current Outpatient Medications   Medication Sig Dispense Refill    mometasone (ELOCON) 0 1 % lotion Apply topically daily 60 mL 0    sildenafil (VIAGRA) 100 mg tablet Take 1 tablet (100 mg total) by mouth daily as needed for erectile dysfunction 10 tablet 5     No current facility-administered medications for this visit  No Known Allergies   Immunizations:     Immunization History   Administered Date(s) Administered    Influenza Quadrivalent Preservative Free 3 years and older IM 01/12/2017      Health Maintenance:         Topic Date Due    Hepatitis C Screening  1955    CRC Screening: Colonoscopy  1955         Topic Date Due    DTaP,Tdap,and Td Vaccines (1 - Tdap) 08/20/1965    Pneumococcal Vaccine: 65+ Years (1 of 2 - PCV13) 08/20/2019    Influenza Vaccine  07/01/2020      Medicare Screening Tests and Risk Assessments:     Bhargav De Los Santos is here for his Welcome to Medicare visit  Health Risk Assessment:   Patient rates overall health as very good  Patient feels that their physical health rating is slightly better  Eyesight was rated as same  Hearing was rated as same  Patient feels that their emotional and mental health rating is slightly better   Pain experienced in the last 7 days has been none  Patient states that he has experienced no weight loss or gain in last 6 months  Depression Screening:   PHQ-2 Score: 0      Fall Risk Screening: In the past year, patient has experienced: no history of falling in past year      Home Safety:  Patient does not have trouble with stairs inside or outside of their home  Patient has working smoke alarms and has no working carbon monoxide detector  Home safety hazards include: none  Nutrition:   Current diet is Regular and Limited junk food  Medications:   Patient is able to manage medications  Activities of Daily Living (ADLs)/Instrumental Activities of Daily Living (IADLs):   Walk and transfer into and out of bed and chair?: Yes  Dress and groom yourself?: Yes    Bathe or shower yourself?: Yes    Feed yourself?  Yes  Do your laundry/housekeeping?: Yes  Manage your money, pay your bills and track your expenses?: Yes  Make your own meals?: Yes    Do your own shopping?: Yes    Durable Medical Equipment Suppliers  None    Previous Hospitalizations:   Any hospitalizations or ED visits within the last 12 months?: No      Advance Care Planning:   Living will: No    Durable POA for healthcare: No    Advanced directive: No      Comments: 5 Wishes given and explained    Cognitive Screening:   Provider or family/friend/caregiver concerned regarding cognition?: No    PREVENTIVE SCREENINGS      Cardiovascular Screening:    General: Screening Not Indicated and History Lipid Disorder      Diabetes Screening:     General: Risks and Benefits Discussed    Due for: Blood Glucose      Colorectal Cancer Screening:     General: Risks and Benefits Discussed    Due for: Cologuard      Prostate Cancer Screening:    General: Risks and Benefits Discussed, Patient Declines and Screening Not Indicated      Osteoporosis Screening:    General: Screening Not Indicated      Abdominal Aortic Aneurysm (AAA) Screening:    Risk factors include: age between 73-69 yo        General: Screening Not Indicated      Lung Cancer Screening:     General: Screening Not Indicated      Hepatitis C Screening:    General: Risks and Benefits Discussed    Hep C Screening Accepted: Yes        Preventive Screening Comments: Cologard     Visual Acuity Screening    Right eye Left eye Both eyes   Without correction:      With correction: 20/20 20/20 20/20        Physical Exam:     /76   Pulse 60   Temp (!) 96 9 °F (36 1 °C)   Ht 5' 8 75" (1 746 m)   Wt 85 3 kg (188 lb)   SpO2 97%   BMI 27 97 kg/m²     Physical Exam   Constitutional: He is oriented to person, place, and time  He appears well-developed  No distress  Somewhat overweight and in no distress   HENT:   Right Ear: External ear normal    Left Ear: External ear normal    Mouth/Throat: Oropharynx is clear and moist  No oropharyngeal exudate  Eyes: Pupils are equal, round, and reactive to light  Conjunctivae are normal  Right eye exhibits no discharge  Left eye exhibits no discharge  No scleral icterus  Neck: Neck supple  No JVD present  No thyromegaly present  Cardiovascular: Normal rate, regular rhythm and normal heart sounds  Pulmonary/Chest: Effort normal and breath sounds normal  No stridor  No respiratory distress  He has no wheezes  Abdominal: Soft  Bowel sounds are normal  He exhibits no mass  There is no tenderness  Musculoskeletal: He exhibits no edema  Lymphadenopathy:     He has no cervical adenopathy  Neurological: He is alert and oriented to person, place, and time  Psychiatric: He has a normal mood and affect  Thought content normal    Nursing note and vitals reviewed        Krissy Acevedo MD

## 2020-07-17 LAB
ALBUMIN SERPL-MCNC: 4.1 G/DL (ref 3.6–5.1)
ALBUMIN/GLOB SERPL: 1.6 (CALC) (ref 1–2.5)
ALP SERPL-CCNC: 67 U/L (ref 35–144)
ALT SERPL-CCNC: 22 U/L (ref 9–46)
AST SERPL-CCNC: 24 U/L (ref 10–35)
BILIRUB SERPL-MCNC: 0.5 MG/DL (ref 0.2–1.2)
BUN SERPL-MCNC: 21 MG/DL (ref 7–25)
BUN/CREAT SERPL: NORMAL (CALC) (ref 6–22)
CALCIUM SERPL-MCNC: 9.2 MG/DL (ref 8.6–10.3)
CHLORIDE SERPL-SCNC: 105 MMOL/L (ref 98–110)
CHOLEST SERPL-MCNC: 236 MG/DL
CHOLEST/HDLC SERPL: 4.5 (CALC)
CO2 SERPL-SCNC: 24 MMOL/L (ref 20–32)
CREAT SERPL-MCNC: 1.03 MG/DL (ref 0.7–1.25)
ERYTHROCYTE [DISTWIDTH] IN BLOOD BY AUTOMATED COUNT: 12.7 % (ref 11–15)
GLOBULIN SER CALC-MCNC: 2.5 G/DL (CALC) (ref 1.9–3.7)
GLUCOSE SERPL-MCNC: 81 MG/DL (ref 65–99)
HCT VFR BLD AUTO: 44.8 % (ref 38.5–50)
HCV AB S/CO SERPL IA: 0.02
HCV AB SERPL QL IA: NORMAL
HDLC SERPL-MCNC: 52 MG/DL
HGB BLD-MCNC: 14.7 G/DL (ref 13.2–17.1)
LDLC SERPL CALC-MCNC: 165 MG/DL (CALC)
MCH RBC QN AUTO: 32.5 PG (ref 27–33)
MCHC RBC AUTO-ENTMCNC: 32.8 G/DL (ref 32–36)
MCV RBC AUTO: 98.9 FL (ref 80–100)
NONHDLC SERPL-MCNC: 184 MG/DL (CALC)
PLATELET # BLD AUTO: 204 THOUSAND/UL (ref 140–400)
PMV BLD REES-ECKER: 12.1 FL (ref 7.5–12.5)
POTASSIUM SERPL-SCNC: 4.7 MMOL/L (ref 3.5–5.3)
PROT SERPL-MCNC: 6.6 G/DL (ref 6.1–8.1)
RBC # BLD AUTO: 4.53 MILLION/UL (ref 4.2–5.8)
SL AMB EGFR AFRICAN AMERICAN: 88 ML/MIN/1.73M2
SL AMB EGFR NON AFRICAN AMERICAN: 76 ML/MIN/1.73M2
SODIUM SERPL-SCNC: 139 MMOL/L (ref 135–146)
TRIGL SERPL-MCNC: 87 MG/DL
WBC # BLD AUTO: 7.8 THOUSAND/UL (ref 3.8–10.8)

## 2020-08-10 ENCOUNTER — TELEMEDICINE (OUTPATIENT)
Dept: FAMILY MEDICINE CLINIC | Facility: CLINIC | Age: 65
End: 2020-08-10
Payer: MEDICARE

## 2020-08-10 VITALS — TEMPERATURE: 98.8 F | BODY MASS INDEX: 27.37 KG/M2 | WEIGHT: 184 LBS

## 2020-08-10 DIAGNOSIS — Z20.828 EXPOSURE TO SARS-ASSOCIATED CORONAVIRUS: Primary | ICD-10-CM

## 2020-08-10 PROCEDURE — 99213 OFFICE O/P EST LOW 20 MIN: CPT | Performed by: FAMILY MEDICINE

## 2020-08-10 NOTE — ASSESSMENT & PLAN NOTE
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

## 2020-08-10 NOTE — PROGRESS NOTES
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  When he does this he wears a mask and socially distances  I do not believe it is likely that this represents COVID-19 but could not rule this out  Did place an order for SARS-CoV-2 test   He is going to see how he feels overnight and potentially go to the    [facility] site tomorrow  He is asked call should he develop any chest pain shortness of breath progressive fever X cetera  He agrees         Relevant Orders    Novel Coronavirus (COVID-19), PCR [facility] - Collected at [facility] or [facility]        This virtual check-in was done via [employer] and patient was informed that this is a secure, HIPAA-compliant platform  He agrees to proceed     Disposition:      I referred Maren Walton to one of our centralized sites for a COVID-19 swab    I spent 10 minutes directly with the patient during this visit    Encounter provider Luann Thao MD    Provider located at [address] [address]    Recent Visits  No visits were found meeting these conditions  Showing recent visits within past 7 days and meeting all other requirements     Today's Visits  Date Type Provider Dept   08/10/20 Telemedicine Luann Thao MD [facility]   Showing today's visits and meeting all other requirements     Future Appointments  No visits were found meeting these conditions     Showing future appointments within next 150 days and meeting all other requirements        Patient agrees to participate in a virtual check in via telephone or video visit instead of presenting to the office to address urgent/immediate medical needs  Patient is aware this is a billable service  After connecting through [employer], the patient was identified by name and date of birth  Geena Pham was informed that this was a telemedicine visit and that the exam was being conducted confidentially over secure lines  My office door was closed  No one else was in the room  Geena Pham acknowledged consent and understanding of privacy and security of the telemedicine visit  I informed the patient that I have reviewed his record in Epic and presented the opportunity for him to ask any questions regarding the visit today  The patient agreed to participate  Yon Tam is a 71 y o  male who is concerned about COVID-19  He reports fever, chills, fatigue and myalgias  He has not experienced repeated shaking with chills, cough, shortness of breath, headache, sore throat, anorexia, anosmia, abdominal pain, diarrhea, nausea and vomiting He has not had contact with a person who is under investigation for or who is positive for COVID-19 within the last 14 days  He has not been hospitalized recently for fever and/or lower respiratory symptoms  Past Medical History:   Diagnosis Date    Acute maxillary sinusitis     Allergic rhinitis     Dermatitis     Erectile dysfunction of nonorganic origin     Hypertriglyceridemia     Sprain of right rotator cuff capsule     Syncope 5/24/2018       History reviewed  No pertinent surgical history  Current Outpatient Medications   Medication Sig Dispense Refill    mometasone (ELOCON) 0 1 % lotion Apply topically daily 60 mL 0    sildenafil (VIAGRA) 100 mg tablet Take 1 tablet (100 mg total) by mouth daily as needed for erectile dysfunction 10 tablet 5     No current facility-administered medications for this visit           No Known Allergies    Review of Systems   Constitutional: Positive for chills, fatigue and fever  HENT: Positive for sore throat  Negative for congestion  Respiratory: Negative  Cardiovascular: Negative  Gastrointestinal: Negative for diarrhea  Endocrine: Negative  Genitourinary: Positive for frequency  Negative for difficulty urinating, dysuria and urgency  Musculoskeletal: Positive for myalgias  Neurological: Negative  Hematological: Negative  Video Exam    Vitals:    08/10/20 1647   Temp: 98 8 °F (37 1 °C)   Weight: 83 5 kg (184 lb)         Bhaskar appears healthy, alert, no distress  Physical Exam  Vitals signs and nursing note reviewed  Constitutional:       Appearance: Normal appearance  Pulmonary:      Effort: Pulmonary effort is normal  No respiratory distress  Neurological:      General: No focal deficit present  Mental Status: He is alert and oriented to person, place, and time  Psychiatric:         Mood and Affect: Mood normal          Thought Content: Thought content normal          Judgment: Judgment normal           VIRTUAL VISIT DISCLAIMER    Colonel Kilgore acknowledges that he has consented to an online visit or consultation  He understands that the online visit is based solely on information provided by him, and that, in the absence of a face-to-face physical evaluation by the physician, the diagnosis he receives is both limited and provisional in terms of accuracy and completeness  This is not intended to replace a full medical face-to-face evaluation by the physician  Colonel Kilgore understands and accepts these terms

## 2021-09-14 ENCOUNTER — TELEPHONE (OUTPATIENT)
Dept: FAMILY MEDICINE CLINIC | Facility: CLINIC | Age: 66
End: 2021-09-14

## 2021-10-12 DIAGNOSIS — F52.21 ERECTILE DYSFUNCTION OF NON-ORGANIC ORIGIN: ICD-10-CM

## 2021-10-12 RX ORDER — SILDENAFIL 100 MG/1
100 TABLET, FILM COATED ORAL DAILY PRN
Qty: 10 TABLET | Refills: 0 | Status: SHIPPED | OUTPATIENT
Start: 2021-10-12 | End: 2021-11-04 | Stop reason: SDUPTHER

## 2021-11-04 ENCOUNTER — OFFICE VISIT (OUTPATIENT)
Dept: FAMILY MEDICINE CLINIC | Facility: CLINIC | Age: 66
End: 2021-11-04
Payer: MEDICARE

## 2021-11-04 VITALS
HEIGHT: 69 IN | DIASTOLIC BLOOD PRESSURE: 80 MMHG | BODY MASS INDEX: 26.81 KG/M2 | HEART RATE: 55 BPM | TEMPERATURE: 98.2 F | SYSTOLIC BLOOD PRESSURE: 120 MMHG | WEIGHT: 181 LBS | OXYGEN SATURATION: 96 %

## 2021-11-04 DIAGNOSIS — Z12.11 SCREENING FOR MALIGNANT NEOPLASM OF COLON: ICD-10-CM

## 2021-11-04 DIAGNOSIS — F52.21 ERECTILE DYSFUNCTION OF NON-ORGANIC ORIGIN: ICD-10-CM

## 2021-11-04 DIAGNOSIS — Z00.00 MEDICARE ANNUAL WELLNESS VISIT, SUBSEQUENT: Primary | ICD-10-CM

## 2021-11-04 DIAGNOSIS — E78.1 HYPERTRIGLYCERIDEMIA: ICD-10-CM

## 2021-11-04 PROBLEM — Z20.828 EXPOSURE TO SARS-ASSOCIATED CORONAVIRUS: Status: RESOLVED | Noted: 2020-08-10 | Resolved: 2021-11-04

## 2021-11-04 PROBLEM — M54.50 ACUTE MIDLINE LOW BACK PAIN WITHOUT SCIATICA: Status: RESOLVED | Noted: 2019-01-14 | Resolved: 2021-11-04

## 2021-11-04 PROBLEM — M75.122 COMPLETE TEAR OF LEFT ROTATOR CUFF: Status: RESOLVED | Noted: 2018-03-01 | Resolved: 2021-11-04

## 2021-11-04 PROCEDURE — 1123F ACP DISCUSS/DSCN MKR DOCD: CPT | Performed by: FAMILY MEDICINE

## 2021-11-04 PROCEDURE — G0438 PPPS, INITIAL VISIT: HCPCS | Performed by: FAMILY MEDICINE

## 2021-11-04 RX ORDER — SILDENAFIL 100 MG/1
100 TABLET, FILM COATED ORAL DAILY PRN
Qty: 10 TABLET | Refills: 2 | Status: SHIPPED | OUTPATIENT
Start: 2021-11-04 | End: 2021-11-04 | Stop reason: SDUPTHER

## 2021-11-04 RX ORDER — SILDENAFIL 100 MG/1
100 TABLET, FILM COATED ORAL DAILY PRN
Qty: 10 TABLET | Refills: 2 | Status: SHIPPED | OUTPATIENT
Start: 2021-11-04

## 2022-09-21 ENCOUNTER — RA CDI HCC (OUTPATIENT)
Dept: OTHER | Facility: HOSPITAL | Age: 67
End: 2022-09-21

## 2022-09-21 NOTE — PROGRESS NOTES
[address]  coding opportunities       Chart reviewed, no opportunity found: CHART REVIEWED, NO OPPORTUNITY FOUND        Patients Insurance     Medicare Insurance: Medicare

## 2022-09-29 ENCOUNTER — OFFICE VISIT (OUTPATIENT)
Dept: FAMILY MEDICINE CLINIC | Facility: CLINIC | Age: 67
End: 2022-09-29
Payer: MEDICARE

## 2022-09-29 VITALS
HEART RATE: 60 BPM | DIASTOLIC BLOOD PRESSURE: 62 MMHG | HEIGHT: 69 IN | TEMPERATURE: 94.9 F | OXYGEN SATURATION: 97 % | SYSTOLIC BLOOD PRESSURE: 100 MMHG | WEIGHT: 187.8 LBS | BODY MASS INDEX: 27.81 KG/M2

## 2022-09-29 DIAGNOSIS — Z12.11 SCREENING FOR COLON CANCER: ICD-10-CM

## 2022-09-29 DIAGNOSIS — F52.21 ERECTILE DYSFUNCTION OF NON-ORGANIC ORIGIN: ICD-10-CM

## 2022-09-29 DIAGNOSIS — E78.1 HYPERTRIGLYCERIDEMIA: Primary | ICD-10-CM

## 2022-09-29 DIAGNOSIS — R35.0 URINARY FREQUENCY: ICD-10-CM

## 2022-09-29 DIAGNOSIS — R53.83 OTHER FATIGUE: ICD-10-CM

## 2022-09-29 PROBLEM — L40.9 PSORIASIS: Status: ACTIVE | Noted: 2022-09-29

## 2022-09-29 PROCEDURE — 99214 OFFICE O/P EST MOD 30 MIN: CPT | Performed by: FAMILY MEDICINE

## 2022-09-29 NOTE — PROGRESS NOTES
Assessment/Plan:      1  Hypertriglyceridemia  -     CBC and differential; Future  -     Lipid panel; Future  -     Comprehensive metabolic panel; Future  -     TSH, 3rd generation with Free T4 reflex; Future  -     PSA, total and free; Future  -     CBC and differential  -     Lipid panel  -     Comprehensive metabolic panel  -     TSH, 3rd generation with Free T4 reflex  -     PSA, total and free    2  Erectile dysfunction of non-organic origin  -     PSA, total and free; Future  -     PSA, total and free    3  Urinary frequency  -     CBC and differential; Future  -     Lipid panel; Future  -     Comprehensive metabolic panel; Future  -     TSH, 3rd generation with Free T4 reflex; Future  -     PSA, total and free; Future  -     CBC and differential  -     Lipid panel  -     Comprehensive metabolic panel  -     TSH, 3rd generation with Free T4 reflex  -     PSA, total and free    4  Other fatigue  -     CBC and differential; Future  -     Lipid panel; Future  -     Comprehensive metabolic panel; Future  -     TSH, 3rd generation with Free T4 reflex; Future  -     PSA, total and free; Future  -     CBC and differential  -     Lipid panel  -     Comprehensive metabolic panel  -     TSH, 3rd generation with Free T4 reflex  -     PSA, total and free    5  Screening for colon cancer  -     Cologuard          Subjective:  Chief Complaint   Patient presents with   [address]     Meet and greet dr Kristine Esquivel,         Patient ID: John Barbosa is a 75 y o  male  Pt is here for his initial visit in our office  He is a transfer from Dr Saira Pace  Review of Systems   Constitutional: Negative  Negative for fatigue and fever  HENT: Negative  Eyes: Negative  Respiratory: Negative  Negative for cough  Cardiovascular: Negative  Gastrointestinal: Negative  Endocrine: Negative  Genitourinary: Negative  Musculoskeletal: Negative  Skin: Negative  Allergic/Immunologic: Negative  Neurological: Negative  Psychiatric/Behavioral: Negative  The following portions of the patient's history were reviewed and updated as appropriate: allergies, current medications, past family history, past medical history, past social history, past surgical history and problem list     Objective:  Vitals:    09/29/22 0952   BP: 100/62   Pulse: 60   Temp: (!) 94 9 °F (34 9 °C)   SpO2: 97%   Weight: 85 2 kg (187 lb 12 8 oz)   Height: 5' 8 75" (1 746 m)      Physical Exam  Vitals and nursing note reviewed  Constitutional:       Appearance: He is well-developed  HENT:      Head: Normocephalic and atraumatic  Cardiovascular:      Rate and Rhythm: Normal rate and regular rhythm  Heart sounds: Normal heart sounds  Pulmonary:      Effort: Pulmonary effort is normal       Breath sounds: Normal breath sounds  Abdominal:      General: Bowel sounds are normal       Palpations: Abdomen is soft  Skin:     General: Skin is warm and dry  Neurological:      Mental Status: He is alert and oriented to person, place, and time  Psychiatric:         Behavior: Behavior normal          Thought Content:  Thought content normal          Judgment: Judgment normal

## 2022-10-01 PROBLEM — Z00.00 MEDICARE ANNUAL WELLNESS VISIT, SUBSEQUENT: Status: RESOLVED | Noted: 2020-07-16 | Resolved: 2022-10-01

## 2022-10-13 ENCOUNTER — CLINICAL SUPPORT (OUTPATIENT)
Dept: FAMILY MEDICINE CLINIC | Facility: CLINIC | Age: 67
End: 2022-10-13
Payer: MEDICARE

## 2022-10-13 DIAGNOSIS — R53.83 OTHER FATIGUE: ICD-10-CM

## 2022-10-13 DIAGNOSIS — E78.1 HYPERTRIGLYCERIDEMIA: ICD-10-CM

## 2022-10-13 DIAGNOSIS — F52.21 ERECTILE DYSFUNCTION OF NON-ORGANIC ORIGIN: ICD-10-CM

## 2022-10-13 DIAGNOSIS — R35.0 URINARY FREQUENCY: Primary | ICD-10-CM

## 2022-10-13 DIAGNOSIS — Z00.00 MEDICARE ANNUAL WELLNESS VISIT, SUBSEQUENT: ICD-10-CM

## 2022-10-13 PROCEDURE — 36415 COLL VENOUS BLD VENIPUNCTURE: CPT | Performed by: FAMILY MEDICINE

## 2022-10-14 LAB
ALBUMIN SERPL-MCNC: 3.9 G/DL (ref 3.6–5.1)
ALBUMIN/GLOB SERPL: 1.4 (CALC) (ref 1–2.5)
ALP SERPL-CCNC: 58 U/L (ref 35–144)
ALT SERPL-CCNC: 18 U/L (ref 9–46)
AST SERPL-CCNC: 26 U/L (ref 10–35)
BASOPHILS # BLD AUTO: 37 CELLS/UL (ref 0–200)
BASOPHILS NFR BLD AUTO: 0.6 %
BILIRUB SERPL-MCNC: 0.9 MG/DL (ref 0.2–1.2)
BUN SERPL-MCNC: 19 MG/DL (ref 7–25)
BUN/CREAT SERPL: NORMAL (CALC) (ref 6–22)
CALCIUM SERPL-MCNC: 8.8 MG/DL (ref 8.6–10.3)
CHLORIDE SERPL-SCNC: 105 MMOL/L (ref 98–110)
CHOLEST SERPL-MCNC: 226 MG/DL
CHOLEST/HDLC SERPL: 4.4 (CALC)
CO2 SERPL-SCNC: 23 MMOL/L (ref 20–32)
CREAT SERPL-MCNC: 0.93 MG/DL (ref 0.7–1.35)
EOSINOPHIL # BLD AUTO: 198 CELLS/UL (ref 15–500)
EOSINOPHIL NFR BLD AUTO: 3.2 %
ERYTHROCYTE [DISTWIDTH] IN BLOOD BY AUTOMATED COUNT: 12.2 % (ref 11–15)
GFR/BSA.PRED SERPLBLD CYS-BASED-ARV: 89 ML/MIN/1.73M2
GLOBULIN SER CALC-MCNC: 2.8 G/DL (CALC) (ref 1.9–3.7)
GLUCOSE SERPL-MCNC: 84 MG/DL (ref 65–99)
HCT VFR BLD AUTO: 41.4 % (ref 38.5–50)
HDLC SERPL-MCNC: 51 MG/DL
HGB BLD-MCNC: 14.2 G/DL (ref 13.2–17.1)
LDLC SERPL CALC-MCNC: 153 MG/DL (CALC)
LYMPHOCYTES # BLD AUTO: 1823 CELLS/UL (ref 850–3900)
LYMPHOCYTES NFR BLD AUTO: 29.4 %
MCH RBC QN AUTO: 33 PG (ref 27–33)
MCHC RBC AUTO-ENTMCNC: 34.3 G/DL (ref 32–36)
MCV RBC AUTO: 96.3 FL (ref 80–100)
MONOCYTES # BLD AUTO: 589 CELLS/UL (ref 200–950)
MONOCYTES NFR BLD AUTO: 9.5 %
NEUTROPHILS # BLD AUTO: 3553 CELLS/UL (ref 1500–7800)
NEUTROPHILS NFR BLD AUTO: 57.3 %
NONHDLC SERPL-MCNC: 175 MG/DL (CALC)
PLATELET # BLD AUTO: 210 THOUSAND/UL (ref 140–400)
PMV BLD REES-ECKER: 11.8 FL (ref 7.5–12.5)
POTASSIUM SERPL-SCNC: 4.3 MMOL/L (ref 3.5–5.3)
PROT SERPL-MCNC: 6.7 G/DL (ref 6.1–8.1)
PSA FREE MFR SERPL: 10 % (CALC)
PSA FREE SERPL-MCNC: 0.2 NG/ML
PSA SERPL-MCNC: 2 NG/ML
RBC # BLD AUTO: 4.3 MILLION/UL (ref 4.2–5.8)
SODIUM SERPL-SCNC: 138 MMOL/L (ref 135–146)
TRIGL SERPL-MCNC: 104 MG/DL
TSH SERPL-ACNC: 3.42 MIU/L (ref 0.4–4.5)
WBC # BLD AUTO: 6.2 THOUSAND/UL (ref 3.8–10.8)

## 2022-10-24 ENCOUNTER — TELEPHONE (OUTPATIENT)
Dept: FAMILY MEDICINE CLINIC | Facility: CLINIC | Age: 67
End: 2022-10-24

## 2022-10-24 NOTE — TELEPHONE ENCOUNTER
----- Message from Dre Hines DO sent at 10/21/2022 11:01 PM EDT -----  No mychart  Your cholesterol is still high, a little better than last year  All other labs are ok

## 2022-11-10 ENCOUNTER — OFFICE VISIT (OUTPATIENT)
Dept: FAMILY MEDICINE CLINIC | Facility: CLINIC | Age: 67
End: 2022-11-10

## 2022-11-10 VITALS
TEMPERATURE: 96 F | OXYGEN SATURATION: 95 % | DIASTOLIC BLOOD PRESSURE: 70 MMHG | HEIGHT: 69 IN | BODY MASS INDEX: 28.14 KG/M2 | WEIGHT: 190 LBS | HEART RATE: 66 BPM | SYSTOLIC BLOOD PRESSURE: 130 MMHG

## 2022-11-10 DIAGNOSIS — Z00.00 MEDICARE ANNUAL WELLNESS VISIT, SUBSEQUENT: Primary | ICD-10-CM

## 2022-11-10 DIAGNOSIS — L30.9 DERMATITIS: ICD-10-CM

## 2022-11-10 DIAGNOSIS — Z23 NEED FOR INFLUENZA VACCINATION: ICD-10-CM

## 2022-11-10 RX ORDER — MOMETASONE FUROATE 1 MG/ML
SOLUTION TOPICAL DAILY
Qty: 60 ML | Refills: 0 | Status: SHIPPED | OUTPATIENT
Start: 2022-11-10

## 2022-11-10 NOTE — PROGRESS NOTES
Assessment and Plan:     Problem List Items Addressed This Visit        Musculoskeletal and Integument    Dermatitis     Mometasone cream as needed         Relevant Medications    mometasone (ELOCON) 0 1 % lotion       Other    Medicare annual wellness visit, subsequent - Primary    Need for influenza vaccination    Relevant Orders    influenza vaccine, high-dose, PF 0 7 mL (FLUZONE HIGH-DOSE) (Completed)        BMI Counseling: Body mass index is 28 26 kg/m²  The BMI is above normal  Nutrition recommendations include decreasing portion sizes  Exercise recommendations include exercising 3-5 times per week  No pharmacotherapy was ordered  Rationale for BMI follow-up plan is due to patient being overweight or obese  Depression Screening and Follow-up Plan: Patient was screened for depression during today's encounter  They screened negative with a PHQ-2 score of 0  Preventive health issues were discussed with patient, and age appropriate screening tests were ordered as noted in patient's After Visit Summary  Personalized health advice and appropriate referrals for health education or preventive services given if needed, as noted in patient's After Visit Summary  History of Present Illness:     Patient presents for a Medicare Wellness Visit    Pt is seen for his medicare wellness today  This is his second visit in our office  He is a transfer patient from Dr Veto Valladares  Patient Care Team:  Julien Benavides DO as PCP - General (Family Medicine)     Review of Systems:     Review of Systems   Constitutional: Negative  Negative for fatigue and fever  HENT: Negative  Eyes: Negative  Respiratory: Negative  Negative for cough  Cardiovascular: Negative  Gastrointestinal: Negative  Endocrine: Negative  Genitourinary: Negative  Musculoskeletal: Negative  Skin: Negative  Allergic/Immunologic: Negative  Neurological: Negative  Psychiatric/Behavioral: Negative           Problem List: Patient Active Problem List   Diagnosis   • Allergic rhinitis   • Dermatitis   • Erectile dysfunction of non-organic origin   • Hypertriglyceridemia   • Medicare annual wellness visit, subsequent   • Psoriasis   • Need for influenza vaccination      Past Medical and Surgical History:     Past Medical History:   Diagnosis Date   • Acute maxillary sinusitis    • Allergic rhinitis    • Complete tear of left rotator cuff 3/1/2018   • Dermatitis    • Erectile dysfunction of nonorganic origin    • Hypertriglyceridemia    • Sprain of right rotator cuff capsule    • Sprain of rotator cuff capsule 9/9/2013   • Syncope 5/24/2018     History reviewed  No pertinent surgical history  Family History:     Family History   Problem Relation Age of Onset   • No Known Problems Mother    • No Known Problems Father       Social History:     Social History     Socioeconomic History   • Marital status: /Civil Union     Spouse name: None   • Number of children: None   • Years of education: None   • Highest education level: None   Occupational History   • None   Tobacco Use   • Smoking status: Never Smoker   • Smokeless tobacco: Never Used   Vaping Use   • Vaping Use: Never used   Substance and Sexual Activity   • Alcohol use: Yes   • Drug use: Yes     Types: Marijuana   • Sexual activity: None   Other Topics Concern   • None   Social History Narrative   • None     Social Determinants of Health     Financial Resource Strain: Low Risk    • Difficulty of Paying Living Expenses: Not hard at all   Food Insecurity: Not on file   Transportation Needs: No Transportation Needs   • Lack of Transportation (Medical): No   • Lack of Transportation (Non-Medical):  No   Physical Activity: Not on file   Stress: Not on file   Social Connections: Not on file   Intimate Partner Violence: Not on file   Housing Stability: Not on file      Medications and Allergies:     Current Outpatient Medications   Medication Sig Dispense Refill   • mometasone (ELOCON) 0 1 % lotion Apply topically daily 60 mL 0   • sildenafil (VIAGRA) 100 mg tablet Take 1 tablet (100 mg total) by mouth daily as needed for erectile dysfunction 10 tablet 2     No current facility-administered medications for this visit  No Known Allergies   Immunizations:     Immunization History   Administered Date(s) Administered   • Influenza Quadrivalent Preservative Free 3 years and older IM 01/12/2017   • Influenza, high dose seasonal 0 7 mL 11/10/2022      Health Maintenance:         Topic Date Due   • Colorectal Cancer Screening  Never done   • Hepatitis C Screening  Completed         Topic Date Due   • COVID-19 Vaccine (1) Never done   • Pneumococcal Vaccine: 65+ Years (1 - PCV) Never done      Medicare Screening Tests and Risk Assessments:     Terell Vasquez is here for his Subsequent Wellness visit  Last Medicare Wellness visit information reviewed, patient interviewed and updates made to the record today  Health Risk Assessment:   Patient rates overall health as good  Patient feels that their physical health rating is same  Patient is satisfied with their life  Eyesight was rated as same  Hearing was rated as same  Patient feels that their emotional and mental health rating is slightly better  Patients states they are never, rarely angry  Patient states they are sometimes unusually tired/fatigued  Pain experienced in the last 7 days has been none  Patient states that he has experienced no weight loss or gain in last 6 months  Depression Screening:   PHQ-2 Score: 0      Fall Risk Screening: In the past year, patient has experienced: no history of falling in past year      Home Safety:  Patient does not have trouble with stairs inside or outside of their home  Patient has working smoke alarms and has working carbon monoxide detector  Home safety hazards include: none  Nutrition:   Current diet is Regular       Medications:   Patient is not currently taking any over-the-counter supplements  Patient is able to manage medications  Activities of Daily Living (ADLs)/Instrumental Activities of Daily Living (IADLs):   Walk and transfer into and out of bed and chair?: Yes  Dress and groom yourself?: Yes    Bathe or shower yourself?: Yes    Feed yourself? Yes  Do your laundry/housekeeping?: Yes  Manage your money, pay your bills and track your expenses?: Yes  Make your own meals?: Yes    Do your own shopping?: Yes    Previous Hospitalizations:   Any hospitalizations or ED visits within the last 12 months?: No      Advance Care Planning:   Living will: Yes    Durable POA for healthcare: Yes    Advanced directive: Yes      Cognitive Screening:   Provider or family/friend/caregiver concerned regarding cognition?: No    PREVENTIVE SCREENINGS      Cardiovascular Screening:    General: Screening Not Indicated and History Lipid Disorder      Diabetes Screening:     General: Screening Current      Prostate Cancer Screening:    General: Screening Current      Abdominal Aortic Aneurysm (AAA) Screening:    Risk factors include: age between 73-67 yo        Lung Cancer Screening:     General: Screening Not Indicated      Hepatitis C Screening:    General: Screening Current    Screening, Brief Intervention, and Referral to Treatment (SBIRT)    Screening  Typical number of drinks in a day: 1  Typical number of drinks in a week: 5  Interpretation: Low risk drinking behavior  AUDIT-C Screenin) How often did you have a drink containing alcohol in the past year? monthly or less  2) How many drinks did you have on a typical day when you were drinking in the past year?  1 to 2  3) How often did you have 6 or more drinks on one occasion in the past year? less than monthly    AUDIT-C Score: 2  Interpretation: Score 0-3 (male): Negative screen for alcohol misuse    Single Item Drug Screening:  How often have you used an illegal drug (including marijuana) or a prescription medication for non-medical reasons in the past year? weekly    Single Item Drug Screen Score: 3  Interpretation: POSITIVE screen for possible drug use disorder    Drug Abuse Screening Test (DAST-10):  1) Have you used drugs other than those required for medical reasons? Yes  2) Do you abuse more than one drug at a time? No  3) Are you always able to stop using drugs when you want to? Yes  4) Have you had "blackouts" or "flashbacks" as a result of drug use? No  5) Do you ever feel bad or guilty about your drug use? No  6) Does your spouse (or parents) ever complain about your involvement with drugs? No  7) Have you neglected your family because of your use of drugs? No  8) Have you engaged in illegal activities in order to obtain drugs? No  9) Have you ever experienced withdrawal symptoms (felt sick) when you stopped taking drugs? No  10) Have you had medical problems as a result of your drug use (e g , memory loss, hepatitis, convulsions, bleeding, etc )? No    DAST-10 Score: 1  Interpretation: Low level problems related to drug abuse    No exam data present     Physical Exam:     /70   Pulse 66   Temp (!) 96 °F (35 6 °C)   Ht 5' 8 75" (1 746 m)   Wt 86 2 kg (190 lb)   SpO2 95%   BMI 28 26 kg/m²     Physical Exam  Vitals and nursing note reviewed  Constitutional:       Appearance: He is well-developed  HENT:      Head: Normocephalic  Right Ear: External ear normal       Left Ear: External ear normal       Nose: Nose normal    Eyes:      Conjunctiva/sclera: Conjunctivae normal       Pupils: Pupils are equal, round, and reactive to light  Cardiovascular:      Rate and Rhythm: Normal rate and regular rhythm  Pulmonary:      Effort: Pulmonary effort is normal       Breath sounds: Normal breath sounds  Abdominal:      General: Bowel sounds are normal       Palpations: Abdomen is soft  Musculoskeletal:         General: Normal range of motion  Cervical back: Normal range of motion and neck supple     Skin:     General: Skin is warm and dry  Neurological:      Mental Status: He is alert and oriented to person, place, and time  Psychiatric:         Behavior: Behavior normal          Thought Content:  Thought content normal          Judgment: Judgment normal           Jay Valdez, DO

## 2022-11-10 NOTE — PATIENT INSTRUCTIONS
Medicare Preventive Visit Patient Instructions  Thank you for completing your Welcome to Medicare Visit or Medicare Annual Wellness Visit today  Your next wellness visit will be due in one year (11/11/2023)  The screening/preventive services that you may require over the next 5-10 years are detailed below  Some tests may not apply to you based off risk factors and/or age  Screening tests ordered at today's visit but not completed yet may show as past due  Also, please note that scanned in results may not display below  Preventive Screenings:  Service Recommendations Previous Testing/Comments   Colorectal Cancer Screening  · Colonoscopy    · Fecal Occult Blood Test (FOBT)/Fecal Immunochemical Test (FIT)  · Fecal DNA/Cologuard Test  · Flexible Sigmoidoscopy Age: 39-70 years old   Colonoscopy: every 10 years (May be performed more frequently if at higher risk)  OR  FOBT/FIT: every 1 year  OR  Cologuard: every 3 years  OR  Sigmoidoscopy: every 5 years  Screening may be recommended earlier than age 39 if at higher risk for colorectal cancer  Also, an individualized decision between you and your healthcare provider will decide whether screening between the ages of 74-80 would be appropriate   Colonoscopy: Not on file  FOBT/FIT: Not on file  Cologuard: Not on file  Sigmoidoscopy: Not on file          Prostate Cancer Screening Individualized decision between patient and health care provider in men between ages of 53-78   Medicare will cover every 12 months beginning on the day after your 50th birthday PSA: 2 0 ng/mL     Screening Current     Hepatitis C Screening Once for adults born between 1945 and 1965  More frequently in patients at high risk for Hepatitis C Hep C Antibody: 07/16/2020    Screening Current   Diabetes Screening 1-2 times per year if you're at risk for diabetes or have pre-diabetes Fasting glucose: No results in last 5 years (No results in last 5 years)  A1C: No results in last 5 years (No results in last 5 years)  Screening Current   Cholesterol Screening Once every 5 years if you don't have a lipid disorder  May order more often based on risk factors  Lipid panel: 10/13/2022  Screening Not Indicated  History Lipid Disorder      Other Preventive Screenings Covered by Medicare:  1  Abdominal Aortic Aneurysm (AAA) Screening: covered once if your at risk  You're considered to be at risk if you have a family history of AAA or a male between the age of 73-68 who smoking at least 100 cigarettes in your lifetime  2  Lung Cancer Screening: covers low dose CT scan once per year if you meet all of the following conditions: (1) Age 50-69; (2) No signs or symptoms of lung cancer; (3) Current smoker or have quit smoking within the last 15 years; (4) You have a tobacco smoking history of at least 20 pack years (packs per day x number of years you smoked); (5) You get a written order from a healthcare provider  3  Glaucoma Screening: covered annually if you're considered high risk: (1) You have diabetes OR (2) Family history of glaucoma OR (3)  aged 48 and older OR (3)  American aged 72 and older  3  Osteoporosis Screening: covered every 2 years if you meet one of the following conditions: (1) Have a vertebral abnormality; (2) On glucocorticoid therapy for more than 3 months; (3) Have primary hyperparathyroidism; (4) On osteoporosis medications and need to assess response to drug therapy  5  HIV Screening: covered annually if you're between the age of 12-76  Also covered annually if you are younger than 13 and older than 72 with risk factors for HIV infection  For pregnant patients, it is covered up to 3 times per pregnancy      Immunizations:  Immunization Recommendations   Influenza Vaccine Annual influenza vaccination during flu season is recommended for all persons aged >= 6 months who do not have contraindications   Pneumococcal Vaccine   * Pneumococcal conjugate vaccine = PCV13 (Prevnar 13), PCV15 (Vaxneuvance), PCV20 (Prevnar 20)  * Pneumococcal polysaccharide vaccine = PPSV23 (Pneumovax) Adults 2364 years old: 1-3 doses may be recommended based on certain risk factors  Adults 72 years old: 1-2 doses may be recommended based off what pneumonia vaccine you previously received   Hepatitis B Vaccine 3 dose series if at intermediate or high risk (ex: diabetes, end stage renal disease, liver disease)   Tetanus (Td) Vaccine - COST NOT COVERED BY MEDICARE PART B Following completion of primary series, a booster dose should be given every 10 years to maintain immunity against tetanus  Td may also be given as tetanus wound prophylaxis  Tdap Vaccine - COST NOT COVERED BY MEDICARE PART B Recommended at least once for all adults  For pregnant patients, recommended with each pregnancy  Shingles Vaccine (Shingrix) - COST NOT COVERED BY MEDICARE PART B  2 shot series recommended in those aged 48 and above     Health Maintenance Due:      Topic Date Due   • Colorectal Cancer Screening  Never done   • Hepatitis C Screening  Completed     Immunizations Due:      Topic Date Due   • COVID-19 Vaccine (1) Never done   • Pneumococcal Vaccine: 65+ Years (1 - PCV) Never done   • Influenza Vaccine (1) 09/01/2022     Advance Directives   What are advance directives? Advance directives are legal documents that state your wishes and plans for medical care  These plans are made ahead of time in case you lose your ability to make decisions for yourself  Advance directives can apply to any medical decision, such as the treatments you want, and if you want to donate organs  What are the types of advance directives? There are many types of advance directives, and each state has rules about how to use them  You may choose a combination of any of the following:  · Living will: This is a written record of the treatment you want   You can also choose which treatments you do not want, which to limit, and which to stop at a certain time  This includes surgery, medicine, IV fluid, and tube feedings  · Durable power of  for healthcare [facility]): This is a written record that states who you want to make healthcare choices for you when you are unable to make them for yourself  This person, called a proxy, is usually a family member or a friend  You may choose more than 1 proxy  · Do not resuscitate (DNR) order:  A DNR order is used in case your heart stops beating or you stop breathing  It is a request not to have certain forms of treatment, such as CPR  A DNR order may be included in other types of advance directives  · Medical directive: This covers the care that you want if you are in a coma, near death, or unable to make decisions for yourself  You can list the treatments you want for each condition  Treatment may include pain medicine, surgery, blood transfusions, dialysis, IV or tube feedings, and a ventilator (breathing machine)  · Values history: This document has questions about your views, beliefs, and how you feel and think about life  This information can help others choose the care that you would choose  Why are advance directives important? An advance directive helps you control your care  Although spoken wishes may be used, it is better to have your wishes written down  Spoken wishes can be misunderstood, or not followed  Treatments may be given even if you do not want them  An advance directive may make it easier for your family to make difficult choices about your care  Weight Management   Why it is important to manage your weight:  Being overweight increases your risk of health conditions such as heart disease, high blood pressure, type 2 diabetes, and certain types of cancer  It can also increase your risk for osteoarthritis, sleep apnea, and other respiratory problems  Aim for a slow, steady weight loss  Even a small amount of weight loss can lower your risk of health problems    How to lose weight safely:  A safe and healthy way to lose weight is to eat fewer calories and get regular exercise  You can lose up about 1 pound a week by decreasing the number of calories you eat by 500 calories each day  Healthy meal plan for weight management:  A healthy meal plan includes a variety of foods, contains fewer calories, and helps you stay healthy  A healthy meal plan includes the following:  · Eat whole-grain foods more often  A healthy meal plan should contain fiber  Fiber is the part of grains, fruits, and vegetables that is not broken down by your body  Whole-grain foods are healthy and provide extra fiber in your diet  Some examples of whole-grain foods are whole-wheat breads and pastas, oatmeal, brown rice, and bulgur  · Eat a variety of vegetables every day  Include dark, leafy greens such as spinach, kale, kasi greens, and mustard greens  Eat yellow and orange vegetables such as carrots, sweet potatoes, and winter squash  · Eat a variety of fruits every day  Choose fresh or canned fruit (canned in its own juice or light syrup) instead of juice  Fruit juice has very little or no fiber  · Eat low-fat dairy foods  Drink fat-free (skim) milk or 1% milk  Eat fat-free yogurt and low-fat cottage cheese  Try low-fat cheeses such as mozzarella and other reduced-fat cheeses  · Choose meat and other protein foods that are low in fat  Choose beans or other legumes such as split peas or lentils  Choose fish, skinless poultry (chicken or turkey), or lean cuts of red meat (beef or pork)  Before you cook meat or poultry, cut off any visible fat  · Use less fat and oil  Try baking foods instead of frying them  Add less fat, such as margarine, sour cream, regular salad dressing and mayonnaise to foods  Eat fewer high-fat foods  Some examples of high-fat foods include french fries, doughnuts, ice cream, and cakes  · Eat fewer sweets  Limit foods and drinks that are high in sugar   This includes candy, cookies, regular soda, and sweetened drinks  Exercise:  Exercise at least 30 minutes per day on most days of the week  Some examples of exercise include walking, biking, dancing, and swimming  You can also fit in more physical activity by taking the stairs instead of the elevator or parking farther away from stores  Ask your healthcare provider about the best exercise plan for you  © Copyright [employer] 2018 Information is for End User's use only and may not be sold, redistributed or otherwise used for commercial purposes   All illustrations and images included in CareNotes® are the copyrighted property of A D A M , Inc  or [address] [place] [address] [employer]

## 2022-11-14 NOTE — ASSESSMENT & PLAN NOTE
Mometasone cream as needed Stelara Counseling:  I discussed with the patient the risks of ustekinumab including but not limited to immunosuppression, malignancy, posterior leukoencephalopathy syndrome, and serious infections.  The patient understands that monitoring is required including a PPD at baseline and must alert us or the primary physician if symptoms of infection or other concerning signs are noted.

## 2022-11-16 ENCOUNTER — OFFICE VISIT (OUTPATIENT)
Dept: FAMILY MEDICINE CLINIC | Facility: CLINIC | Age: 67
End: 2022-11-16

## 2022-11-16 VITALS
DIASTOLIC BLOOD PRESSURE: 72 MMHG | OXYGEN SATURATION: 98 % | HEIGHT: 69 IN | WEIGHT: 189 LBS | HEART RATE: 62 BPM | SYSTOLIC BLOOD PRESSURE: 126 MMHG | RESPIRATION RATE: 16 BRPM | BODY MASS INDEX: 27.99 KG/M2

## 2022-11-16 DIAGNOSIS — Z23 NEED FOR PNEUMOCOCCAL VACCINATION: ICD-10-CM

## 2022-11-16 DIAGNOSIS — Z23 NEED FOR VACCINATION: ICD-10-CM

## 2022-11-16 DIAGNOSIS — S70.362A TICK BITE OF LEFT THIGH, INITIAL ENCOUNTER: Primary | ICD-10-CM
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
## 2022-11-16 NOTE — PROGRESS NOTES
Name: Serina Vidal      : 1955      MRN: [id number]  Encounter Provider: ROSALES Payan  Encounter Date: 2022   Encounter department: [facility]     1  Tick bite of left thigh, initial encounter  Assessment & Plan:  Monitor for signs and symptoms of lyme- if develop will test blood work, to early to test now  Keep area clean and dry okay to apply abx ointment      2  Need for vaccination  -     Pneumococcal Conjugate Vaccine 20-valent (Pcv20)    3  Need for pneumococcal vaccination         Subjective      Here today to have a tick bite looked at  He noticed a tick bite on left upper thigh/groin on Friday morning 2:30am  He removed it and has been applying neosporin to the area  It became red, inflammed and had a white head but has since improved and now is a small scab with no swelling and minimal erythema  No bulls eye  No fever, worsening joint pain, headache, fatigue  Review of Systems   Constitutional: Negative  HENT: Negative  Respiratory: Negative  Gastrointestinal: Negative  Genitourinary: Negative  Musculoskeletal: Negative for arthralgias  Skin:        Tick bite left upper thigh     Neurological: Negative for headaches  Hematological: Negative  Current Outpatient Medications on File Prior to Visit   Medication Sig   • mometasone (ELOCON) 0 1 % lotion Apply topically daily   • sildenafil (VIAGRA) 100 mg tablet Take 1 tablet (100 mg total) by mouth daily as needed for erectile dysfunction       Objective     /72 (BP Location: Right arm, Patient Position: Sitting, Cuff Size: Large)   Pulse 62   Resp 16   Ht 5' 8 75" (1 746 m)   Wt 85 7 kg (189 lb)   SpO2 98%   BMI 28 11 kg/m²     Physical Exam  Vitals and nursing note reviewed  Constitutional:       General: He is not in acute distress  Appearance: Normal appearance  He is not ill-appearing  HENT:      Head: Normocephalic     Cardiovascular: Rate and Rhythm: Regular rhythm  Heart sounds: Normal heart sounds  Pulmonary:      Effort: Pulmonary effort is normal  No respiratory distress  Breath sounds: Normal breath sounds  Abdominal:      Palpations: Abdomen is soft  Skin:         Neurological:      Mental Status: He is alert         Tesha Lombardo

## 2022-11-16 NOTE — PATIENT INSTRUCTIONS
Monitor for signs and symptoms of lyme- if develop will test blood work, to early to test now  Keep area clean and dry okay to apply antibiotic ointment

## 2022-11-16 NOTE — ASSESSMENT & PLAN NOTE
Monitor for signs and symptoms of lyme- if develop will test blood work, to early to test now  Keep area clean and dry okay to apply abx ointment

## 2023-01-09 PROBLEM — Z00.00 MEDICARE ANNUAL WELLNESS VISIT, SUBSEQUENT: Status: RESOLVED | Noted: 2020-07-16 | Resolved: 2023-01-09

## 2023-11-28 ENCOUNTER — TELEPHONE (OUTPATIENT)
Dept: FAMILY MEDICINE CLINIC | Facility: CLINIC | Age: 68
End: 2023-11-28

## 2024-01-19 ENCOUNTER — TELEPHONE (OUTPATIENT)
Dept: FAMILY MEDICINE CLINIC | Facility: CLINIC | Age: 69
End: 2024-01-19

## 2024-03-21 ENCOUNTER — RA CDI HCC (OUTPATIENT)
Dept: OTHER | Facility: HOSPITAL | Age: 69
End: 2024-03-21

## 2024-03-29 ENCOUNTER — OFFICE VISIT (OUTPATIENT)
Dept: FAMILY MEDICINE CLINIC | Facility: CLINIC | Age: 69
End: 2024-03-29
Payer: COMMERCIAL

## 2024-03-29 VITALS
HEART RATE: 74 BPM | DIASTOLIC BLOOD PRESSURE: 68 MMHG | BODY MASS INDEX: 27.11 KG/M2 | WEIGHT: 183 LBS | HEIGHT: 69 IN | OXYGEN SATURATION: 98 % | SYSTOLIC BLOOD PRESSURE: 126 MMHG | TEMPERATURE: 96 F

## 2024-03-29 DIAGNOSIS — F52.21 ERECTILE DYSFUNCTION OF NON-ORGANIC ORIGIN: ICD-10-CM

## 2024-03-29 DIAGNOSIS — Z00.00 MEDICARE ANNUAL WELLNESS VISIT, SUBSEQUENT: Primary | ICD-10-CM

## 2024-03-29 DIAGNOSIS — L30.9 DERMATITIS: ICD-10-CM

## 2024-03-29 DIAGNOSIS — R53.82 CHRONIC FATIGUE: ICD-10-CM

## 2024-03-29 DIAGNOSIS — R35.0 URINARY FREQUENCY: ICD-10-CM

## 2024-03-29 DIAGNOSIS — E78.1 HYPERTRIGLYCERIDEMIA: ICD-10-CM

## 2024-03-29 PROBLEM — Z23 NEED FOR INFLUENZA VACCINATION: Status: RESOLVED | Noted: 2022-11-10 | Resolved: 2024-03-29

## 2024-03-29 PROCEDURE — G0439 PPPS, SUBSEQ VISIT: HCPCS | Performed by: FAMILY MEDICINE

## 2024-03-29 RX ORDER — SILDENAFIL 100 MG/1
100 TABLET, FILM COATED ORAL DAILY PRN
Qty: 10 TABLET | Refills: 2 | Status: SHIPPED | OUTPATIENT
Start: 2024-03-29

## 2024-03-29 RX ORDER — MOMETASONE FUROATE 1 MG/ML
SOLUTION TOPICAL DAILY
Qty: 60 ML | Refills: 0 | Status: SHIPPED | OUTPATIENT
Start: 2024-03-29

## 2024-03-29 NOTE — PATIENT INSTRUCTIONS
Add fiber supplement- metamucil/ citucel or benefiber   Fasting blood work-     Medicare Preventive Visit Patient Instructions  Thank you for completing your Welcome to Medicare Visit or Medicare Annual Wellness Visit today. Your next wellness visit will be due in one year (3/30/2025).  The screening/preventive services that you may require over the next 5-10 years are detailed below. Some tests may not apply to you based off risk factors and/or age. Screening tests ordered at today's visit but not completed yet may show as past due. Also, please note that scanned in results may not display below.  Preventive Screenings:  Service Recommendations Previous Testing/Comments   Colorectal Cancer Screening  Colonoscopy    Fecal Occult Blood Test (FOBT)/Fecal Immunochemical Test (FIT)  Fecal DNA/Cologuard Test  Flexible Sigmoidoscopy Age: 45-75 years old   Colonoscopy: every 10 years (May be performed more frequently if at higher risk)  OR  FOBT/FIT: every 1 year  OR  Cologuard: every 3 years  OR  Sigmoidoscopy: every 5 years  Screening may be recommended earlier than age 45 if at higher risk for colorectal cancer. Also, an individualized decision between you and your healthcare provider will decide whether screening between the ages of 76-85 would be appropriate. Colonoscopy: Not on file  FOBT/FIT: Not on file  Cologuard: Not on file  Sigmoidoscopy: Not on file          Prostate Cancer Screening Individualized decision between patient and health care provider in men between ages of 55-69   Medicare will cover every 12 months beginning on the day after your 50th birthday PSA: 2.0 ng/mL           Hepatitis C Screening Once for adults born between 1945 and 1965  More frequently in patients at high risk for Hepatitis C Hep C Antibody: 07/16/2020        Diabetes Screening 1-2 times per year if you're at risk for diabetes or have pre-diabetes Fasting glucose: No results in last 5 years (No results in last 5 years)  A1C: No  results in last 5 years (No results in last 5 years)      Cholesterol Screening Once every 5 years if you don't have a lipid disorder. May order more often based on risk factors. Lipid panel: 10/13/2022         Other Preventive Screenings Covered by Medicare:  Abdominal Aortic Aneurysm (AAA) Screening: covered once if your at risk. You're considered to be at risk if you have a family history of AAA or a male between the age of 65-75 who smoking at least 100 cigarettes in your lifetime.  Lung Cancer Screening: covers low dose CT scan once per year if you meet all of the following conditions: (1) Age 55-77; (2) No signs or symptoms of lung cancer; (3) Current smoker or have quit smoking within the last 15 years; (4) You have a tobacco smoking history of at least 20 pack years (packs per day x number of years you smoked); (5) You get a written order from a healthcare provider.  Glaucoma Screening: covered annually if you're considered high risk: (1) You have diabetes OR (2) Family history of glaucoma OR (3)  aged 50 and older OR (4)  American aged 65 and older  Osteoporosis Screening: covered every 2 years if you meet one of the following conditions: (1) Have a vertebral abnormality; (2) On glucocorticoid therapy for more than 3 months; (3) Have primary hyperparathyroidism; (4) On osteoporosis medications and need to assess response to drug therapy.  HIV Screening: covered annually if you're between the age of 15-65. Also covered annually if you are younger than 15 and older than 65 with risk factors for HIV infection. For pregnant patients, it is covered up to 3 times per pregnancy.    Immunizations:  Immunization Recommendations   Influenza Vaccine Annual influenza vaccination during flu season is recommended for all persons aged >= 6 months who do not have contraindications   Pneumococcal Vaccine   * Pneumococcal conjugate vaccine = PCV13 (Prevnar 13), PCV15 (Vaxneuvance), PCV20 (Prevnar  20)  * Pneumococcal polysaccharide vaccine = PPSV23 (Pneumovax) Adults 19-65 yo with certain risk factors or if 65+ yo  If never received any pneumonia vaccine: recommend Prevnar 20 (PCV20)  Give PCV20 if previously received 1 dose of PCV13 or PPSV23   Hepatitis B Vaccine 3 dose series if at intermediate or high risk (ex: diabetes, end stage renal disease, liver disease)   Respiratory syncytial virus (RSV) Vaccine - COVERED BY MEDICARE PART D  * RSVPreF3 (Arexvy) CDC recommends that adults 60 years of age and older may receive a single dose of RSV vaccine using shared clinical decision-making (SCDM)   Tetanus (Td) Vaccine - COST NOT COVERED BY MEDICARE PART B Following completion of primary series, a booster dose should be given every 10 years to maintain immunity against tetanus. Td may also be given as tetanus wound prophylaxis.   Tdap Vaccine - COST NOT COVERED BY MEDICARE PART B Recommended at least once for all adults. For pregnant patients, recommended with each pregnancy.   Shingles Vaccine (Shingrix) - COST NOT COVERED BY MEDICARE PART B  2 shot series recommended in those 19 years and older who have or will have weakened immune systems or those 50 years and older     Health Maintenance Due:      Topic Date Due    Colorectal Cancer Screening  Never done    Hepatitis C Screening  Completed     Immunizations Due:      Topic Date Due    COVID-19 Vaccine (5 - 2023-24 season) 01/04/2024     Advance Directives   What are advance directives?  Advance directives are legal documents that state your wishes and plans for medical care. These plans are made ahead of time in case you lose your ability to make decisions for yourself. Advance directives can apply to any medical decision, such as the treatments you want, and if you want to donate organs.   What are the types of advance directives?  There are many types of advance directives, and each state has rules about how to use them. You may choose a combination of any  of the following:  Living will:  This is a written record of the treatment you want. You can also choose which treatments you do not want, which to limit, and which to stop at a certain time. This includes surgery, medicine, IV fluid, and tube feedings.   Durable power of  for healthcare (DPAHC):  This is a written record that states who you want to make healthcare choices for you when you are unable to make them for yourself. This person, called a proxy, is usually a family member or a friend. You may choose more than 1 proxy.  Do not resuscitate (DNR) order:  A DNR order is used in case your heart stops beating or you stop breathing. It is a request not to have certain forms of treatment, such as CPR. A DNR order may be included in other types of advance directives.  Medical directive:  This covers the care that you want if you are in a coma, near death, or unable to make decisions for yourself. You can list the treatments you want for each condition. Treatment may include pain medicine, surgery, blood transfusions, dialysis, IV or tube feedings, and a ventilator (breathing machine).  Values history:  This document has questions about your views, beliefs, and how you feel and think about life. This information can help others choose the care that you would choose.  Why are advance directives important?  An advance directive helps you control your care. Although spoken wishes may be used, it is better to have your wishes written down. Spoken wishes can be misunderstood, or not followed. Treatments may be given even if you do not want them. An advance directive may make it easier for your family to make difficult choices about your care.   Weight Management   Why it is important to manage your weight:  Being overweight increases your risk of health conditions such as heart disease, high blood pressure, type 2 diabetes, and certain types of cancer. It can also increase your risk for osteoarthritis, sleep  apnea, and other respiratory problems. Aim for a slow, steady weight loss. Even a small amount of weight loss can lower your risk of health problems.  How to lose weight safely:  A safe and healthy way to lose weight is to eat fewer calories and get regular exercise. You can lose up about 1 pound a week by decreasing the number of calories you eat by 500 calories each day.   Healthy meal plan for weight management:  A healthy meal plan includes a variety of foods, contains fewer calories, and helps you stay healthy. A healthy meal plan includes the following:  Eat whole-grain foods more often.  A healthy meal plan should contain fiber. Fiber is the part of grains, fruits, and vegetables that is not broken down by your body. Whole-grain foods are healthy and provide extra fiber in your diet. Some examples of whole-grain foods are whole-wheat breads and pastas, oatmeal, brown rice, and bulgur.  Eat a variety of vegetables every day.  Include dark, leafy greens such as spinach, kale, kasi greens, and mustard greens. Eat yellow and orange vegetables such as carrots, sweet potatoes, and winter squash.   Eat a variety of fruits every day.  Choose fresh or canned fruit (canned in its own juice or light syrup) instead of juice. Fruit juice has very little or no fiber.  Eat low-fat dairy foods.  Drink fat-free (skim) milk or 1% milk. Eat fat-free yogurt and low-fat cottage cheese. Try low-fat cheeses such as mozzarella and other reduced-fat cheeses.  Choose meat and other protein foods that are low in fat.  Choose beans or other legumes such as split peas or lentils. Choose fish, skinless poultry (chicken or turkey), or lean cuts of red meat (beef or pork). Before you cook meat or poultry, cut off any visible fat.   Use less fat and oil.  Try baking foods instead of frying them. Add less fat, such as margarine, sour cream, regular salad dressing and mayonnaise to foods. Eat fewer high-fat foods. Some examples of high-fat  foods include french fries, doughnuts, ice cream, and cakes.  Eat fewer sweets.  Limit foods and drinks that are high in sugar. This includes candy, cookies, regular soda, and sweetened drinks.  Exercise:  Exercise at least 30 minutes per day on most days of the week. Some examples of exercise include walking, biking, dancing, and swimming. You can also fit in more physical activity by taking the stairs instead of the elevator or parking farther away from stores. Ask your healthcare provider about the best exercise plan for you.      © Copyright [employer] 2018 Information is for End User's use only and may not be sold, redistributed or otherwise used for commercial purposes. All illustrations and images included in CareNotes® are the copyrighted property of [employer] Inc[employer] or [employer]

## 2024-03-29 NOTE — PROGRESS NOTES
Assessment and Plan:     Problem List Items Addressed This Visit          Musculoskeletal and Integument    Dermatitis     Renewal of mometasone lotion- use as needed         Relevant Medications    mometasone (ELOCON) 0.1 % lotion       Other    Erectile dysfunction of non-organic origin    Relevant Medications    sildenafil (VIAGRA) 100 mg tablet    Hypertriglyceridemia    Relevant Orders    Lipid Panel with Direct LDL reflex    Medicare annual wellness visit, subsequent - Primary    Chronic fatigue    Relevant Orders    CBC and differential    Comprehensive metabolic panel    TSH, 3rd generation with Free T4 reflex    Urinary frequency    Relevant Orders    PSA, total and free       Depression Screening and Follow-up Plan: Patient was screened for depression during today's encounter. They screened negative with a PHQ-2 score of 0.      Preventive health issues were discussed with patient, and age appropriate screening tests were ordered as noted in patient's After Visit Summary.  Personalized health advice and appropriate referrals for health education or preventive services given if needed, as noted in patient's After Visit Summary.     History of Present Illness:     Patient presents for a Medicare Wellness Visit    Pt is here for a medicare wellness.   Eggs and jacobs/scraple in the am- and not much other food during the day. No fruits or veggies.   Not much solid bm so has not done cologuard but has box.   No chest pain or shortness of breath. Viagra works well    Has intermittent patches of redness under arms but not itchy. Uses mometasone lotion that he uses for his scalp and clears up in 2 days.   Up about 2 times at night for urination.        Patient Care Team:  Ewa Marshall DO as PCP - General (Family Medicine)     Review of Systems:     Review of Systems   Constitutional: Negative.  Negative for fatigue and fever.   HENT: Negative.     Eyes: Negative.    Respiratory: Negative.  Negative for cough.     Cardiovascular: Negative.    Gastrointestinal: Negative.    Endocrine: Negative.    Genitourinary: Negative.    Musculoskeletal: Negative.    Skin: Negative.    Allergic/Immunologic: Negative.    Neurological: Negative.    Psychiatric/Behavioral: Negative.          Problem List:     Patient Active Problem List   Diagnosis    Allergic rhinitis    Dermatitis    Erectile dysfunction of non-organic origin    Hypertriglyceridemia    Medicare annual wellness visit, subsequent    Psoriasis    Tick bite of left thigh    Chronic fatigue    Urinary frequency      Past Medical and Surgical History:     Past Medical History:   Diagnosis Date    Acute maxillary sinusitis     Allergic rhinitis     Complete tear of left rotator cuff 3/1/2018    Dermatitis     Erectile dysfunction of nonorganic origin     Hypertriglyceridemia     Sprain of right rotator cuff capsule     Sprain of rotator cuff capsule 9/9/2013    Syncope 5/24/2018     History reviewed. No pertinent surgical history.   Family History:     Family History   Problem Relation Age of Onset    No Known Problems Mother     No Known Problems Father       Social History:     Social History     Socioeconomic History    Marital status: /Civil Union     Spouse name: None    Number of children: None    Years of education: None    Highest education level: None   Occupational History    None   Tobacco Use    Smoking status: Never    Smokeless tobacco: Never   Vaping Use    Vaping status: Never Used   Substance and Sexual Activity    Alcohol use: Yes    Drug use: Yes     Types: Marijuana    Sexual activity: None   Other Topics Concern    None   Social History Narrative    None     Social Determinants of Health     Financial Resource Strain: Low Risk  (11/10/2022)    Overall Financial Resource Strain (CARDIA)     Difficulty of Paying Living Expenses: Not hard at all   Food Insecurity: No Food Insecurity (3/29/2024)    Hunger Vital Sign     Worried About Running Out of Food  in the Last Year: Never true     Ran Out of Food in the Last Year: Never true   Transportation Needs: No Transportation Needs (3/29/2024)    PRAPARE - Transportation     Lack of Transportation (Medical): No     Lack of Transportation (Non-Medical): No   Physical Activity: Not on file   Stress: Not on file   Social Connections: Not on file   Intimate Partner Violence: Not on file   Housing Stability: Unknown (3/29/2024)    Housing Stability Vital Sign     Unable to Pay for Housing in the Last Year: No     Number of Places Lived in the Last Year: Not on file     Unstable Housing in the Last Year: No      Medications and Allergies:     Current Outpatient Medications   Medication Sig Dispense Refill    mometasone (ELOCON) 0.1 % lotion Apply topically daily 60 mL 0    sildenafil (VIAGRA) 100 mg tablet Take 1 tablet (100 mg total) by mouth daily as needed for erectile dysfunction 10 tablet 2     No current facility-administered medications for this visit.     No Known Allergies   Immunizations:     Immunization History   Administered Date(s) Administered    COVID-19 MODERNA VACC 0.5 ML IM 06/21/2021, 07/19/2021, 01/17/2022    COVID-19 Moderna mRNA Vaccine 12 Yr+ 50 mcg/0.5 mL (Spikevax) 11/09/2023    INFLUENZA 11/10/2022    Influenza Quadrivalent Preservative Free 3 years and older IM 01/12/2017    Influenza, high dose seasonal 0.7 mL 11/10/2022    Pneumococcal Conjugate Vaccine 20-valent (Pcv20), Polysace 11/16/2022      Health Maintenance:         Topic Date Due    Colorectal Cancer Screening  Never done    Hepatitis C Screening  Completed         Topic Date Due    COVID-19 Vaccine (5 - 2023-24 season) 01/04/2024      Medicare Screening Tests and Risk Assessments:     Bhaskar is here for his Subsequent Wellness visit. Last Medicare Wellness visit information reviewed, patient interviewed and updates made to the record today.      Health Risk Assessment:   Patient rates overall health as very good. Patient feels that  their physical health rating is slightly better. Patient is satisfied with their life. Eyesight was rated as same. Hearing was rated as same. Patient feels that their emotional and mental health rating is slightly better. Patients states they are never, rarely angry. Patient states they are sometimes unusually tired/fatigued. Pain experienced in the last 7 days has been some. Patient's pain rating has been 2/10. Patient states that he has experienced no weight loss or gain in last 6 months.     Depression Screening:   PHQ-2 Score: 0  PHQ-9 Score: 0      Fall Risk Screening:   In the past year, patient has experienced: no history of falling in past year      Home Safety:  Patient does not have trouble with stairs inside or outside of their home. Patient has working smoke alarms and has working carbon monoxide detector. Home safety hazards include: none.     Nutrition:   Current diet is Regular.     Medications:   Patient is not currently taking any over-the-counter supplements. Patient is able to manage medications.     Activities of Daily Living (ADLs)/Instrumental Activities of Daily Living (IADLs):   Walk and transfer into and out of bed and chair?: Yes  Dress and groom yourself?: Yes    Bathe or shower yourself?: Yes    Feed yourself? Yes  Do your laundry/housekeeping?: Yes  Manage your money, pay your bills and track your expenses?: Yes  Make your own meals?: Yes    Do your own shopping?: Yes    Previous Hospitalizations:   Any hospitalizations or ED visits within the last 12 months?: No      PREVENTIVE SCREENINGS      Cardiovascular Screening:    General: Screening Not Indicated and History Lipid Disorder      Abdominal Aortic Aneurysm (AAA) Screening:    Risk factors include: age between 65-76 yo        Lung Cancer Screening:     General: Screening Not Indicated      Hepatitis C Screening:    General: Screening Current    Screening, Brief Intervention, and Referral to Treatment (SBIRT)    Screening      Single  "Item Drug Screening:  How often have you used an illegal drug (including marijuana) or a prescription medication for non-medical reasons in the past year? never    Single Item Drug Screen Score: 0  Interpretation: Negative screen for possible drug use disorder    No results found.     Physical Exam:     /68 (BP Location: Left arm, Patient Position: Sitting, Cuff Size: Standard)   Pulse 74   Temp (!) 96 °F (35.6 °C) (Tympanic)   Ht 5' 8.5\" (1.74 m)   Wt 83 kg (183 lb)   SpO2 98%   BMI 27.42 kg/m²     Physical Exam  Vitals and nursing note reviewed.   Constitutional:       Appearance: He is well-developed.   HENT:      Head: Normocephalic.      Right Ear: External ear normal.      Left Ear: External ear normal.      Nose: Nose normal.   Eyes:      Conjunctiva/sclera: Conjunctivae normal.      Pupils: Pupils are equal, round, and reactive to light.   Cardiovascular:      Rate and Rhythm: Normal rate and regular rhythm.   Pulmonary:      Effort: Pulmonary effort is normal.      Breath sounds: Normal breath sounds.   Abdominal:      General: Bowel sounds are normal.      Palpations: Abdomen is soft.   Musculoskeletal:         General: Normal range of motion.      Cervical back: Normal range of motion and neck supple.   Skin:     General: Skin is warm and dry.   Neurological:      Mental Status: He is alert and oriented to person, place, and time.   Psychiatric:         Behavior: Behavior normal.         Thought Content: Thought content normal.         Judgment: Judgment normal.          Ewa Marshall DO  "

## 2024-05-01 PROBLEM — Z00.00 MEDICARE ANNUAL WELLNESS VISIT, SUBSEQUENT: Status: RESOLVED | Noted: 2020-07-16 | Resolved: 2024-05-01

## 2024-05-02 ENCOUNTER — CLINICAL SUPPORT (OUTPATIENT)
Dept: FAMILY MEDICINE CLINIC | Facility: CLINIC | Age: 69
End: 2024-05-02
Payer: COMMERCIAL

## 2024-05-02 DIAGNOSIS — R53.82 CHRONIC FATIGUE: ICD-10-CM

## 2024-05-02 DIAGNOSIS — L30.9 DERMATITIS: ICD-10-CM

## 2024-05-02 DIAGNOSIS — R53.83 OTHER FATIGUE: ICD-10-CM

## 2024-05-02 DIAGNOSIS — Z12.11 SCREENING FOR COLON CANCER: ICD-10-CM

## 2024-05-02 DIAGNOSIS — Z00.00 MEDICARE ANNUAL WELLNESS VISIT, SUBSEQUENT: ICD-10-CM

## 2024-05-02 DIAGNOSIS — E78.1 HYPERTRIGLYCERIDEMIA: Primary | ICD-10-CM

## 2024-05-02 PROCEDURE — 36415 COLL VENOUS BLD VENIPUNCTURE: CPT | Performed by: FAMILY MEDICINE

## 2024-05-03 LAB
ALBUMIN SERPL-MCNC: 3.9 G/DL (ref 3.6–5.1)
ALBUMIN/GLOB SERPL: 1.4 (CALC) (ref 1–2.5)
ALP SERPL-CCNC: 58 U/L (ref 35–144)
ALT SERPL-CCNC: 17 U/L (ref 9–46)
AST SERPL-CCNC: 21 U/L (ref 10–35)
BASOPHILS # BLD AUTO: 50 CELLS/UL (ref 0–200)
BASOPHILS NFR BLD AUTO: 1 %
BILIRUB SERPL-MCNC: 0.7 MG/DL (ref 0.2–1.2)
BUN SERPL-MCNC: 17 MG/DL (ref 7–25)
BUN/CREAT SERPL: NORMAL (CALC) (ref 6–22)
CALCIUM SERPL-MCNC: 8.8 MG/DL (ref 8.6–10.3)
CHLORIDE SERPL-SCNC: 104 MMOL/L (ref 98–110)
CHOLEST SERPL-MCNC: 236 MG/DL
CHOLEST/HDLC SERPL: 4.5 (CALC)
CO2 SERPL-SCNC: 25 MMOL/L (ref 20–32)
CREAT SERPL-MCNC: 0.87 MG/DL (ref 0.7–1.35)
EOSINOPHIL # BLD AUTO: 170 CELLS/UL (ref 15–500)
EOSINOPHIL NFR BLD AUTO: 3.4 %
ERYTHROCYTE [DISTWIDTH] IN BLOOD BY AUTOMATED COUNT: 12.5 % (ref 11–15)
GFR/BSA.PRED SERPLBLD CYS-BASED-ARV: 93 ML/MIN/1.73M2
GLOBULIN SER CALC-MCNC: 2.7 G/DL (CALC) (ref 1.9–3.7)
GLUCOSE SERPL-MCNC: 97 MG/DL (ref 65–99)
HCT VFR BLD AUTO: 42.5 % (ref 38.5–50)
HDLC SERPL-MCNC: 53 MG/DL
HGB BLD-MCNC: 14.3 G/DL (ref 13.2–17.1)
LDLC SERPL CALC-MCNC: 158 MG/DL (CALC)
LYMPHOCYTES # BLD AUTO: 1320 CELLS/UL (ref 850–3900)
LYMPHOCYTES NFR BLD AUTO: 26.4 %
MCH RBC QN AUTO: 33.7 PG (ref 27–33)
MCHC RBC AUTO-ENTMCNC: 33.6 G/DL (ref 32–36)
MCV RBC AUTO: 100.2 FL (ref 80–100)
MONOCYTES # BLD AUTO: 555 CELLS/UL (ref 200–950)
MONOCYTES NFR BLD AUTO: 11.1 %
NEUTROPHILS # BLD AUTO: 2905 CELLS/UL (ref 1500–7800)
NEUTROPHILS NFR BLD AUTO: 58.1 %
NONHDLC SERPL-MCNC: 183 MG/DL (CALC)
PLATELET # BLD AUTO: 205 THOUSAND/UL (ref 140–400)
PMV BLD REES-ECKER: 12.3 FL (ref 7.5–12.5)
POTASSIUM SERPL-SCNC: 4.3 MMOL/L (ref 3.5–5.3)
PROT SERPL-MCNC: 6.6 G/DL (ref 6.1–8.1)
PSA FREE MFR SERPL: 6 % (CALC)
PSA FREE SERPL-MCNC: 0.2 NG/ML
PSA SERPL-MCNC: 3.3 NG/ML
RBC # BLD AUTO: 4.24 MILLION/UL (ref 4.2–5.8)
SODIUM SERPL-SCNC: 139 MMOL/L (ref 135–146)
TRIGL SERPL-MCNC: 128 MG/DL
TSH SERPL-ACNC: 4.41 MIU/L (ref 0.4–4.5)
WBC # BLD AUTO: 5 THOUSAND/UL (ref 3.8–10.8)

## 2024-05-06 ENCOUNTER — TELEPHONE (OUTPATIENT)
Dept: FAMILY MEDICINE CLINIC | Facility: CLINIC | Age: 69
End: 2024-05-06

## 2024-05-06 NOTE — TELEPHONE ENCOUNTER
----- Message from Ewa Marshall DO sent at 5/5/2024 10:06 PM EDT -----  No mychart.  Your prostate level is higher than last year but still ok.  Your sugar level is a little higher but still in normal range.   Your kidney function is better than last time. Your liver function is normal is normal.

## 2024-08-22 ENCOUNTER — OFFICE VISIT (OUTPATIENT)
Dept: FAMILY MEDICINE CLINIC | Facility: CLINIC | Age: 69
End: 2024-08-22
Payer: COMMERCIAL

## 2024-08-22 VITALS
HEART RATE: 61 BPM | TEMPERATURE: 97.2 F | WEIGHT: 180 LBS | DIASTOLIC BLOOD PRESSURE: 64 MMHG | OXYGEN SATURATION: 97 % | BODY MASS INDEX: 26.66 KG/M2 | HEIGHT: 69 IN | SYSTOLIC BLOOD PRESSURE: 112 MMHG

## 2024-08-22 DIAGNOSIS — S90.851A SPLINTER OF RIGHT FOOT, INITIAL ENCOUNTER: Primary | ICD-10-CM

## 2024-08-22 PROCEDURE — 28190 REMOVAL OF FOOT FOREIGN BODY: CPT

## 2024-08-22 PROCEDURE — 99213 OFFICE O/P EST LOW 20 MIN: CPT

## 2024-08-22 RX ORDER — CEPHALEXIN 500 MG/1
500 CAPSULE ORAL 2 TIMES DAILY
Qty: 14 CAPSULE | Refills: 0 | Status: SHIPPED | OUTPATIENT
Start: 2024-08-22 | End: 2024-08-29

## 2024-08-22 NOTE — PROGRESS NOTES
"Ambulatory Visit  Name: Bhaskar Wei      : 1955      MRN: [id number]  Encounter Provider: Gretel Andrade DO  Encounter Date: 2024   Encounter department: [facility]    Assessment & Plan   1. Splinter of right foot, initial encounter  Assessment & Plan:  -after stepping on thorn 1 week ago   -2-3mm thorn removed from plantar surface of right foot with forceps     Plan:   Bandage applied, keep area clean and dry   Keflex 500mg BID x 7 for pain/erythema to prevent infection   Strongly encourage tetanus booster, can go to pharmacy today    Orders:  -     cephalexin (KEFLEX) 500 mg capsule; Take 1 capsule (500 mg total) by mouth 2 (two) times a day for 7 days  -     Foreign body removal       History of Present Illness     States he stepped on a thorn about 1 week ago on his right foot. Was able to pull most of it out put can't get it all out. States it is painful if he steps on it in a certain way. Pain and minimal surrounding erythema.    States he and his wife have been \"digging at it\" and trying to get the remaining bit out but to no evail.    Not sure when the last time he had a tetanus booster was.     Universal Protocol:  procedure performed by consultantConsent: Verbal consent obtained.  Consent given by: patient  Patient understanding: patient states understanding of the procedure being performed  Patient identity confirmed: verbally with patient  Foreign body removal    Date/Time: 2024 9:30 AM    Performed by: Gretel Andrade DO  Authorized by: Gretel Andrade DO  Body area: skin  General location: lower extremity  Location details: right foot  Removal mechanism: forceps  Dressing: dressing applied  Depth: subcutaneous  Complexity: simple  1 objects recovered.  Objects recovered: thorn, approx 2-3mm in length  Post-procedure assessment: foreign body removed  Patient tolerance: patient tolerated the procedure well with no immediate complications          Review of " "Systems   Constitutional:  Negative for chills and fever.   HENT:  Negative for ear pain and sore throat.    Eyes:  Negative for pain and visual disturbance.   Respiratory:  Negative for cough and shortness of breath.    Cardiovascular:  Negative for chest pain and palpitations.   Gastrointestinal:  Negative for abdominal pain and vomiting.   Genitourinary:  Negative for dysuria and hematuria.   Musculoskeletal:  Negative for arthralgias and back pain.   Skin:  Positive for wound. Negative for color change and rash.   Neurological:  Negative for seizures and syncope.   All other systems reviewed and are negative.      Objective     /64   Pulse 61   Temp (!) 97.2 °F (36.2 °C) (Tympanic)   Ht 5' 8.5\" (1.74 m)   Wt 81.6 kg (180 lb)   SpO2 97%   BMI 26.97 kg/m²     Physical Exam  Constitutional:       General: He is not in acute distress.     Appearance: Normal appearance. He is not ill-appearing or toxic-appearing.   HENT:      Head: Normocephalic and atraumatic.      Right Ear: External ear normal.      Left Ear: External ear normal.      Nose: Nose normal.   Eyes:      Conjunctiva/sclera: Conjunctivae normal.   Cardiovascular:      Rate and Rhythm: Normal rate and regular rhythm.      Heart sounds: Normal heart sounds. No murmur heard.  Pulmonary:      Effort: No respiratory distress.      Breath sounds: Normal breath sounds. No wheezing, rhonchi or rales.   Musculoskeletal:        Feet:    Skin:     General: Skin is warm and dry.   Neurological:      General: No focal deficit present.      Mental Status: He is alert and oriented to person, place, and time.   Psychiatric:         Mood and Affect: Mood normal.         Behavior: Behavior normal.       Administrative Statements           "

## 2024-08-22 NOTE — ASSESSMENT & PLAN NOTE
-after stepping on thorn 1 week ago   -2-3mm thorn removed from plantar surface of right foot with forceps     Plan:   Bandage applied, keep area clean and dry   Keflex 500mg BID x 7 for pain/erythema to prevent infection   Strongly encourage tetanus booster, can go to pharmacy today

## 2024-12-29 ENCOUNTER — OFFICE VISIT (OUTPATIENT)
Dept: URGENT CARE | Facility: CLINIC | Age: 69
End: 2024-12-29
Payer: COMMERCIAL

## 2024-12-29 ENCOUNTER — APPOINTMENT (OUTPATIENT)
Dept: RADIOLOGY | Facility: CLINIC | Age: 69
End: 2024-12-29
Payer: COMMERCIAL

## 2024-12-29 VITALS
TEMPERATURE: 98.7 F | SYSTOLIC BLOOD PRESSURE: 130 MMHG | RESPIRATION RATE: 18 BRPM | HEART RATE: 71 BPM | DIASTOLIC BLOOD PRESSURE: 70 MMHG | OXYGEN SATURATION: 97 %

## 2024-12-29 DIAGNOSIS — S46.211A BICEPS MUSCLE STRAIN, RIGHT, INITIAL ENCOUNTER: Primary | ICD-10-CM

## 2024-12-29 DIAGNOSIS — M79.621 PAIN OF RIGHT UPPER ARM: ICD-10-CM

## 2024-12-29 PROCEDURE — 99213 OFFICE O/P EST LOW 20 MIN: CPT

## 2024-12-29 PROCEDURE — 73030 X-RAY EXAM OF SHOULDER: CPT

## 2024-12-29 NOTE — PATIENT INSTRUCTIONS
Your x-rays were read by the provider. A radiologist will also read the x-rays and you will be notified of any abnormalities.     Rest Ice Elevation    Tylenol and Motrin/Advil/Aleve for pain relief    Follow-up with ortho - referral placed.    Go to the ED for any severely worsening symptoms.

## 2024-12-29 NOTE — PROGRESS NOTES
"  [facility]        NAME: Bhaskar Wei is a 69 y.o. male  : 1955    MRN: [id number]  DATE: 2024  TIME: 3:54 PM    Assessment and Plan   Biceps muscle strain, right, initial encounter [S46.433R]  1. Biceps muscle strain, right, initial encounter  Ambulatory Referral to Orthopedic Surgery      2. Pain of right upper arm  XR shoulder 2+ vw right            Patient Instructions     Your x-rays were read by the provider. A radiologist will also read the x-rays and you will be notified of any abnormalities.     Rest Ice Elevation    Tylenol and Motrin/Advil/Aleve for pain relief    Follow-up with ortho - referral placed.    Go to the ED for any severely worsening symptoms.    If tests are performed, our office will contact you with results only if changes need to made to the care plan discussed with you at the visit. [facility]      Chief Complaint     Chief Complaint   Patient presents with    Right Upper Arm Pain     Pt reports he grabbed his 85 lb puppy by the collar. When the puppy lunged forward, the pt heard a pop in his bicep/upper arm area. Pt reports pain in bicep/upper arm area since event today. Pt reports dull tingling in addition to an ache.          History of Present Illness       69-year-old male presenting with right shoulder and bicep muscle pain. Patient states he grabbed onto the collar of his 85 lb puppy who leaped forward, pulling his arm abruptly outwards. Believes he felt a \"pop.\" Occurred a few hours ago. Notes a tingling sensation and dull ache to the area. Denies weakness. No clicking, popping, or locking. Spouse applied ice pack. Took ASA.        Review of Systems   Review of Systems   Constitutional:  Negative for fever.   Respiratory:  Negative for shortness of breath.    Cardiovascular:  Negative for chest pain.   Gastrointestinal:  Negative for abdominal pain.   Musculoskeletal:  Negative for back pain and neck " pain.        Right upper arm pain   Skin:  Negative for wound.   Neurological:  Negative for headaches.         Current Medications       Current Outpatient Medications:     mometasone (ELOCON) 0.1 % lotion, Apply topically daily, Disp: 60 mL, Rfl: 0    sildenafil (VIAGRA) 100 mg tablet, Take 1 tablet (100 mg total) by mouth daily as needed for erectile dysfunction, Disp: 10 tablet, Rfl: 2    Current Allergies     Allergies as of 12/29/2024    (No Known Allergies)            The following portions of the patient's history were reviewed and updated as appropriate: allergies, current medications, past family history, past medical history, past social history, past surgical history and problem list.     Past Medical History:   Diagnosis Date    Acute maxillary sinusitis     Allergic rhinitis     Complete tear of left rotator cuff 3/1/2018    Dermatitis     Erectile dysfunction of nonorganic origin     Hypertriglyceridemia     Sprain of right rotator cuff capsule     Sprain of rotator cuff capsule 9/9/2013    Syncope 5/24/2018       History reviewed. No pertinent surgical history.    Family History   Problem Relation Age of Onset    No Known Problems Mother     No Known Problems Father          Medications have been verified.        Objective   /70   Pulse 71   Temp 98.7 °F (37.1 °C)   Resp 18   SpO2 97%        Physical Exam     Physical Exam  Vitals and nursing note reviewed.   Constitutional:       General: He is not in acute distress.  HENT:      Head: Normocephalic and atraumatic.      Mouth/Throat:      Mouth: Mucous membranes are moist.   Cardiovascular:      Rate and Rhythm: Normal rate.   Pulmonary:      Effort: Pulmonary effort is normal.   Musculoskeletal:      Right shoulder: Tenderness present. No swelling or effusion. Decreased range of motion. Normal strength.      Right upper arm: Tenderness present. No swelling or deformity.      Right elbow: Normal.      Right wrist: Normal pulse.       Cervical back: Normal range of motion and neck supple.      Comments: No Alex deformity. No ecchymosis.   Skin:     General: Skin is warm and dry.      Capillary Refill: Capillary refill takes less than 2 seconds.   Neurological:      Mental Status: He is alert and oriented to person, place, and time.

## 2025-07-07 ENCOUNTER — OFFICE VISIT (OUTPATIENT)
Dept: FAMILY MEDICINE CLINIC | Facility: CLINIC | Age: 70
End: 2025-07-07
Payer: COMMERCIAL

## 2025-07-07 VITALS
WEIGHT: 173 LBS | TEMPERATURE: 97.2 F | OXYGEN SATURATION: 99 % | BODY MASS INDEX: 25.62 KG/M2 | DIASTOLIC BLOOD PRESSURE: 60 MMHG | HEART RATE: 61 BPM | HEIGHT: 69 IN | SYSTOLIC BLOOD PRESSURE: 126 MMHG

## 2025-07-07 DIAGNOSIS — L30.9 DERMATITIS: ICD-10-CM

## 2025-07-07 DIAGNOSIS — R35.0 URINARY FREQUENCY: ICD-10-CM

## 2025-07-07 DIAGNOSIS — E78.1 HYPERTRIGLYCERIDEMIA: ICD-10-CM

## 2025-07-07 DIAGNOSIS — G89.29 CHRONIC PAIN OF RIGHT KNEE: ICD-10-CM

## 2025-07-07 DIAGNOSIS — L40.9 PSORIASIS: ICD-10-CM

## 2025-07-07 DIAGNOSIS — R53.82 CHRONIC FATIGUE: ICD-10-CM

## 2025-07-07 DIAGNOSIS — M25.561 CHRONIC PAIN OF RIGHT KNEE: ICD-10-CM

## 2025-07-07 DIAGNOSIS — F41.9 ANXIETY: ICD-10-CM

## 2025-07-07 DIAGNOSIS — Z00.00 MEDICARE ANNUAL WELLNESS VISIT, SUBSEQUENT: Primary | ICD-10-CM

## 2025-07-07 DIAGNOSIS — F52.21 ERECTILE DYSFUNCTION OF NON-ORGANIC ORIGIN: ICD-10-CM

## 2025-07-07 PROBLEM — S90.851A SPLINTER OF RIGHT FOOT: Status: RESOLVED | Noted: 2024-08-22 | Resolved: 2025-07-07

## 2025-07-07 PROBLEM — S70.362A TICK BITE OF LEFT THIGH: Status: RESOLVED | Noted: 2022-11-16 | Resolved: 2025-07-07
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
## 2025-07-07 PROCEDURE — G2211 COMPLEX E/M VISIT ADD ON: HCPCS | Performed by: FAMILY MEDICINE

## 2025-07-07 PROCEDURE — G0439 PPPS, SUBSEQ VISIT: HCPCS | Performed by: FAMILY MEDICINE

## 2025-07-07 PROCEDURE — 99213 OFFICE O/P EST LOW 20 MIN: CPT | Performed by: FAMILY MEDICINE

## 2025-07-07 RX ORDER — MOMETASONE FUROATE 1 MG/ML
LOTION TOPICAL DAILY
Qty: 60 ML | Refills: 0 | Status: SHIPPED | OUTPATIENT
Start: 2025-07-07

## 2025-07-07 RX ORDER — SILDENAFIL 100 MG/1
100 TABLET, FILM COATED ORAL DAILY PRN
Qty: 10 TABLET | Refills: 2 | Status: SHIPPED | OUTPATIENT
Start: 2025-07-07

## 2025-07-07 RX ORDER — DIAZEPAM 5 MG/1
TABLET ORAL
Qty: 2 TABLET | Refills: 0 | Status: SHIPPED | OUTPATIENT
Start: 2025-07-07

## 2025-07-07 NOTE — PROGRESS NOTES
Name: Bhaskar Wei      : 1955      MRN: [id number]  Encounter Provider: Ewa Marshall DO  Encounter Date: 2025   Encounter department: [facility]  :  Assessment & Plan  Medicare annual wellness visit, subsequent         Chronic pain of right knee  Right knee instability. Recommend mri right knee secondary to increased pain over the past 3 months after his initial injury. History of arthroscopic surgery about 22 years ago. Will need sedation for mri due to claustrophobia. Will need ortho evaluation. Significantly impairing mobility, activity, ADLs.   Orders:    MRI knee right  wo contrast; Future    Anxiety  Diazepam prior to procedure, mri  Orders:    diazepam (VALIUM) 5 mg tablet; Take 1-2 tabs po 30 min prior to procedure    CBC and differential; Future    Comprehensive metabolic panel; Future    Psoriasis  Requesting renewal of mometasone.         Erectile dysfunction of non-organic origin  Requesting renewal on viagra  Orders:    sildenafil (VIAGRA) 100 mg tablet; Take 1 tablet (100 mg total) by mouth daily as needed for erectile dysfunction    Dermatitis    Orders:    mometasone (ELOCON) 0.1 % lotion; Apply topically daily    Chronic fatigue  Fasting blood work  Orders:    CBC and differential; Future    Comprehensive metabolic panel; Future    TSH, 3rd generation with Free T4 reflex; Future    Hypertriglyceridemia  Due for lipid panel  Orders:    Lipid Panel with Direct LDL reflex    Urinary frequency    Orders:    Comprehensive metabolic panel; Future    PSA, total and free; Future      Depression Screening and Follow-up Plan: Patient was screened for depression during today's encounter. They screened negative with a PHQ-2 score of 0.        Preventive health issues were discussed with patient, and age appropriate screening tests were ordered as noted in patient's After Visit Summary. Personalized health advice and appropriate referrals for health education or preventive  services given if needed, as noted in patient's After Visit Summary.    History of Present Illness     Pt is seen for a medicare wellness and Complains of right knee pain- started 2-3 months ago   Was chasing his dog and had right knee pain.   He was Using cane for a few weeks.   Increased pain in right knee when going up and down steps.   Relies on shopping cart when walking in grocery store  Feels like it is unstable. -   Has knee Pain at night   Took ibuprofenpm- helps with pain at night  Uses Ibuprofen- icy hot and knee brace  Hx Broke leg   22 years ago had arthroscopic surgery        Patient Care Team:  Ewa Marshall DO as PCP - General (Family Medicine)    Review of Systems   Constitutional: Negative.  Negative for fatigue and fever.   HENT: Negative.     Eyes: Negative.    Respiratory: Negative.  Negative for cough.    Cardiovascular: Negative.    Gastrointestinal: Negative.    Endocrine: Negative.    Genitourinary: Negative.    Musculoskeletal:  Positive for arthralgias, gait problem, joint swelling and myalgias.   Skin: Negative.    Allergic/Immunologic: Negative.    Psychiatric/Behavioral: Negative.       Medical History Reviewed by provider this encounter:  Tobacco  Allergies  Meds  Problems  Med Hx  Surg Hx  Fam Hx       Annual Wellness Visit Questionnaire   Bhaskar is here for his Subsequent Wellness visit. Last Medicare Wellness visit information reviewed, patient interviewed, no change since last AWV.     Health Risk Assessment:   Patient rates overall health as good. Patient feels that their physical health rating is same. Patient is satisfied with their life. Eyesight was rated as same. Hearing was rated as same. Patient feels that their emotional and mental health rating is slightly better. Patients states they are never, rarely angry. Patient states they are never, rarely unusually tired/fatigued. Pain experienced in the last 7 days has been some. Patient's pain rating has been 7/10.  Patient states that he has experienced no weight loss or gain in last 6 months.     Depression Screening:   PHQ-2 Score: 0      Fall Risk Screening:   In the past year, patient has experienced: no history of falling in past year      Home Safety:  Patient does not have trouble with stairs inside or outside of their home. Patient has working smoke alarms and has working carbon monoxide detector. Home safety hazards include: none.     Nutrition:   Current diet is Regular.     Medications:   Patient is currently taking over-the-counter supplements. OTC medications include: see medication list. Patient is able to manage medications.     Activities of Daily Living (ADLs)/Instrumental Activities of Daily Living (IADLs):   Walk and transfer into and out of bed and chair?: Yes  Dress and groom yourself?: Yes    Bathe or shower yourself?: Yes    Feed yourself? Yes  Do your laundry/housekeeping?: Yes  Manage your money, pay your bills and track your expenses?: Yes  Make your own meals?: Yes    Do your own shopping?: Yes    Previous Hospitalizations:   Any hospitalizations or ED visits within the last 12 months?: No      Preventive Screenings      Cardiovascular Screening:    General: Screening Not Indicated and History Lipid Disorder      Abdominal Aortic Aneurysm (AAA) Screening:    Risk factors include: age between 65-76 yo        Lung Cancer Screening:     General: Screening Not Indicated      Hepatitis C Screening:    General: Screening Current    Immunizations:  - Immunizations due: Zoster (Shingrix)    Screening, Brief Intervention, and Referral to Treatment (SBIRT)     Screening  Typical number of drinks in a day: 1  Typical number of drinks in a week: 6  Interpretation: Low risk drinking behavior.    AUDIT-C Screenin) How often did you have a drink containing alcohol in the past year? 2 to 4 times a month  2) How many drinks did you have on a typical day when you were drinking in the past year? 1 to 2  3) How  "often did you have 6 or more drinks on one occasion in the past year? never    AUDIT-C Score: 2  Interpretation: Score 0-3 (male): Negative screen for alcohol misuse    Single Item Drug Screening:  How often have you used an illegal drug (including marijuana) or a prescription medication for non-medical reasons in the past year? less than monthly    Single Item Drug Screen Score: 1  Interpretation: POSITIVE screen for possible drug use disorder    Drug Abuse Screening Test (DAST-10):  1) Have you used drugs other than those required for medical reasons? Yes  2) Do you abuse more than one drug at a time? No  3) Are you always able to stop using drugs when you want to? Yes  4) Have you had \"blackouts\" or \"flashbacks\" as a result of drug use? No  5) Do you ever feel bad or guilty about your drug use? No  6) Does your spouse (or parents) ever complain about your involvement with drugs? No  7) Have you neglected your family because of your use of drugs? No  8) Have you engaged in illegal activities in order to obtain drugs? No  9) Have you ever experienced withdrawal symptoms (felt sick) when you stopped taking drugs? No  10) Have you had medical problems as a result of your drug use (e.g., memory loss, hepatitis, convulsions, bleeding, etc.)? No    DAST-10 Score: 1  Interpretation: Low level problems related to drug abuse    Social Drivers of Health     Financial Resource Strain: Low Risk  (11/10/2022)    Overall Financial Resource Strain (CARDIA)     Difficulty of Paying Living Expenses: Not hard at all   Food Insecurity: No Food Insecurity (7/8/2025)    Nursing - Inadequate Food Risk Classification     Worried About Running Out of Food in the Last Year: Never true     Ran Out of Food in the Last Year: Never true   Transportation Needs: No Transportation Needs (7/8/2025)    PRAPARE - Transportation     Lack of Transportation (Medical): No     Lack of Transportation (Non-Medical): No   Housing Stability: Low Risk  " "(7/8/2025)    Housing Stability Vital Sign     Unable to Pay for Housing in the Last Year: No     Number of Times Moved in the Last Year: 0     Homeless in the Last Year: No   Utilities: Not At Risk (7/8/2025)    [facility] Utilities     Threatened with loss of utilities: No     No results found.    Objective   /60   Pulse 61   Temp (!) 97.2 °F (36.2 °C) (Tympanic)   Ht 5' 8.5\" (1.74 m)   Wt 78.5 kg (173 lb)   SpO2 99%   BMI 25.92 kg/m²     Physical Exam  Vitals and nursing note reviewed.   Constitutional:       Appearance: He is well-developed.   HENT:      Head: Normocephalic and atraumatic.     Cardiovascular:      Rate and Rhythm: Normal rate and regular rhythm.      Heart sounds: Normal heart sounds.   Pulmonary:      Effort: Pulmonary effort is normal.      Breath sounds: Normal breath sounds.   Abdominal:      General: Bowel sounds are normal.      Palpations: Abdomen is soft.     Musculoskeletal:         General: Swelling, tenderness, deformity and signs of injury present.      Comments: Tenderness posterior right knee, medially and laterally,   Mild edema     Skin:     General: Skin is warm and dry.     Neurological:      Mental Status: He is alert and oriented to person, place, and time.     Psychiatric:         Behavior: Behavior normal.         Thought Content: Thought content normal.         Judgment: Judgment normal.         "

## 2025-07-07 NOTE — PATIENT INSTRUCTIONS
Schedule mri right knee  Ortho evaluation       Medicare Preventive Visit Patient Instructions  Thank you for completing your Welcome to Medicare Visit or Medicare Annual Wellness Visit today. Your next wellness visit will be due in one year (7/8/2026).  The screening/preventive services that you may require over the next 5-10 years are detailed below. Some tests may not apply to you based off risk factors and/or age. Screening tests ordered at today's visit but not completed yet may show as past due. Also, please note that scanned in results may not display below.  Preventive Screenings:  Service Recommendations Previous Testing/Comments   Colorectal Cancer Screening  Colonoscopy    Fecal Occult Blood Test (FOBT)/Fecal Immunochemical Test (FIT)  Fecal DNA/Cologuard Test  Flexible Sigmoidoscopy Age: 45-75 years old   Colonoscopy: every 10 years (May be performed more frequently if at higher risk)  OR  FOBT/FIT: every 1 year  OR  Cologuard: every 3 years  OR  Sigmoidoscopy: every 5 years  Screening may be recommended earlier than age 45 if at higher risk for colorectal cancer. Also, an individualized decision between you and your healthcare provider will decide whether screening between the ages of 76-85 would be appropriate. Colonoscopy: Not on file  FOBT/FIT: Not on file  Cologuard: Not on file  Sigmoidoscopy: Not on file          Prostate Cancer Screening Individualized decision between patient and health care provider in men between ages of 55-69   Medicare will cover every 12 months beginning on the day after your 50th birthday PSA: 3.3 ng/mL           Hepatitis C Screening Once for adults born between 1945 and 1965  More frequently in patients at high risk for Hepatitis C Hep C Antibody: 07/16/2020    Screening Current   Diabetes Screening 1-2 times per year if you're at risk for diabetes or have pre-diabetes Fasting glucose: No results in last 5 years (No results in last 5 years)  A1C: No results in last 5  years (No results in last 5 years)      Cholesterol Screening Once every 5 years if you don't have a lipid disorder. May order more often based on risk factors. Lipid panel: 05/02/2024  Screening Not Indicated  History Lipid Disorder      Other Preventive Screenings Covered by Medicare:  Abdominal Aortic Aneurysm (AAA) Screening: covered once if your at risk. You're considered to be at risk if you have a family history of AAA or a male between the age of 65-75 who smoking at least 100 cigarettes in your lifetime.  Lung Cancer Screening: covers low dose CT scan once per year if you meet all of the following conditions: (1) Age 55-77; (2) No signs or symptoms of lung cancer; (3) Current smoker or have quit smoking within the last 15 years; (4) You have a tobacco smoking history of at least 20 pack years (packs per day x number of years you smoked); (5) You get a written order from a healthcare provider.  Glaucoma Screening: covered annually if you're considered high risk: (1) You have diabetes OR (2) Family history of glaucoma OR (3)  aged 50 and older OR (4)  American aged 65 and older  Osteoporosis Screening: covered every 2 years if you meet one of the following conditions: (1) Have a vertebral abnormality; (2) On glucocorticoid therapy for more than 3 months; (3) Have primary hyperparathyroidism; (4) On osteoporosis medications and need to assess response to drug therapy.  HIV Screening: covered annually if you're between the age of 15-65. Also covered annually if you are younger than 15 and older than 65 with risk factors for HIV infection. For pregnant patients, it is covered up to 3 times per pregnancy.    Immunizations:  Immunization Recommendations   Influenza Vaccine Annual influenza vaccination during flu season is recommended for all persons aged >= 6 months who do not have contraindications   Pneumococcal Vaccine   * Pneumococcal conjugate vaccine = PCV13 (Prevnar 13), PCV15  (Vaxneuvance), PCV20 (Prevnar 20)  * Pneumococcal polysaccharide vaccine = PPSV23 (Pneumovax) Adults 19-63 yo with certain risk factors or if 65+ yo  If never received any pneumonia vaccine: recommend Prevnar 20 (PCV20)  Give PCV20 if previously received 1 dose of PCV13 or PPSV23   Hepatitis B Vaccine 3 dose series if at intermediate or high risk (ex: diabetes, end stage renal disease, liver disease)   Respiratory syncytial virus (RSV) Vaccine - COVERED BY MEDICARE PART D  * RSVPreF3 (Arexvy) CDC recommends that adults 60 years of age and older may receive a single dose of RSV vaccine using shared clinical decision-making (SCDM)   Tetanus (Td) Vaccine - COST NOT COVERED BY MEDICARE PART B Following completion of primary series, a booster dose should be given every 10 years to maintain immunity against tetanus. Td may also be given as tetanus wound prophylaxis.   Tdap Vaccine - COST NOT COVERED BY MEDICARE PART B Recommended at least once for all adults. For pregnant patients, recommended with each pregnancy.   Shingles Vaccine (Shingrix) - COST NOT COVERED BY MEDICARE PART B  2 shot series recommended in those 19 years and older who have or will have weakened immune systems or those 50 years and older     Health Maintenance Due:      Topic Date Due    Colorectal Cancer Screening  Never done    Hepatitis C Screening  Completed     Immunizations Due:      Topic Date Due    COVID-19 Vaccine (5 - 2024-25 season) 09/01/2024    Influenza Vaccine (1) 09/01/2025     Advance Directives   What are advance directives?  Advance directives are legal documents that state your wishes and plans for medical care. These plans are made ahead of time in case you lose your ability to make decisions for yourself. Advance directives can apply to any medical decision, such as the treatments you want, and if you want to donate organs.   What are the types of advance directives?  There are many types of advance directives, and each state has  rules about how to use them. You may choose a combination of any of the following:  Living will:  This is a written record of the treatment you want. You can also choose which treatments you do not want, which to limit, and which to stop at a certain time. This includes surgery, medicine, IV fluid, and tube feedings.   Durable power of  for healthcare (DPAHC):  This is a written record that states who you want to make healthcare choices for you when you are unable to make them for yourself. This person, called a proxy, is usually a family member or a friend. You may choose more than 1 proxy.  Do not resuscitate (DNR) order:  A DNR order is used in case your heart stops beating or you stop breathing. It is a request not to have certain forms of treatment, such as CPR. A DNR order may be included in other types of advance directives.  Medical directive:  This covers the care that you want if you are in a coma, near death, or unable to make decisions for yourself. You can list the treatments you want for each condition. Treatment may include pain medicine, surgery, blood transfusions, dialysis, IV or tube feedings, and a ventilator (breathing machine).  Values history:  This document has questions about your views, beliefs, and how you feel and think about life. This information can help others choose the care that you would choose.  Why are advance directives important?  An advance directive helps you control your care. Although spoken wishes may be used, it is better to have your wishes written down. Spoken wishes can be misunderstood, or not followed. Treatments may be given even if you do not want them. An advance directive may make it easier for your family to make difficult choices about your care.   Weight Management   Why it is important to manage your weight:  Being overweight increases your risk of health conditions such as heart disease, high blood pressure, type 2 diabetes, and certain types of  cancer. It can also increase your risk for osteoarthritis, sleep apnea, and other respiratory problems. Aim for a slow, steady weight loss. Even a small amount of weight loss can lower your risk of health problems.  How to lose weight safely:  A safe and healthy way to lose weight is to eat fewer calories and get regular exercise. You can lose up about 1 pound a week by decreasing the number of calories you eat by 500 calories each day.   Healthy meal plan for weight management:  A healthy meal plan includes a variety of foods, contains fewer calories, and helps you stay healthy. A healthy meal plan includes the following:  Eat whole-grain foods more often.  A healthy meal plan should contain fiber. Fiber is the part of grains, fruits, and vegetables that is not broken down by your body. Whole-grain foods are healthy and provide extra fiber in your diet. Some examples of whole-grain foods are whole-wheat breads and pastas, oatmeal, brown rice, and bulgur.  Eat a variety of vegetables every day.  Include dark, leafy greens such as spinach, kale, kasi greens, and mustard greens. Eat yellow and orange vegetables such as carrots, sweet potatoes, and winter squash.   Eat a variety of fruits every day.  Choose fresh or canned fruit (canned in its own juice or light syrup) instead of juice. Fruit juice has very little or no fiber.  Eat low-fat dairy foods.  Drink fat-free (skim) milk or 1% milk. Eat fat-free yogurt and low-fat cottage cheese. Try low-fat cheeses such as mozzarella and other reduced-fat cheeses.  Choose meat and other protein foods that are low in fat.  Choose beans or other legumes such as split peas or lentils. Choose fish, skinless poultry (chicken or turkey), or lean cuts of red meat (beef or pork). Before you cook meat or poultry, cut off any visible fat.   Use less fat and oil.  Try baking foods instead of frying them. Add less fat, such as margarine, sour cream, regular salad dressing and  mayonnaise to foods. Eat fewer high-fat foods. Some examples of high-fat foods include french fries, doughnuts, ice cream, and cakes.  Eat fewer sweets.  Limit foods and drinks that are high in sugar. This includes candy, cookies, regular soda, and sweetened drinks.  Exercise:  Exercise at least 30 minutes per day on most days of the week. Some examples of exercise include walking, biking, dancing, and swimming. You can also fit in more physical activity by taking the stairs instead of the elevator or parking farther away from stores. Ask your healthcare provider about the best exercise plan for you.    © Copyright [employer] 2018 Information is for End User's use only and may not be sold, redistributed or otherwise used for commercial purposes. All illustrations and images included in CareNotes® are the copyrighted property of [employer] or [employer]

## 2025-07-08 ENCOUNTER — TELEPHONE (OUTPATIENT)
Age: 70
End: 2025-07-08

## 2025-07-08 NOTE — ASSESSMENT & PLAN NOTE
Diazepam prior to procedure, mri  Orders:    diazepam (VALIUM) 5 mg tablet; Take 1-2 tabs po 30 min prior to procedure    CBC and differential; Future    Comprehensive metabolic panel; Future

## 2025-07-08 NOTE — ASSESSMENT & PLAN NOTE
Right knee instability. Recommend mri right knee secondary to increased pain over the past 3 months after his initial injury. History of arthroscopic surgery about 22 years ago. Will need sedation for mri due to claustrophobia. Will need ortho evaluation. Significantly impairing mobility, activity, ADLs.   Orders:    MRI knee right  wo contrast; Future

## 2025-07-08 NOTE — ASSESSMENT & PLAN NOTE
Fasting blood work  Orders:    CBC and differential; Future    Comprehensive metabolic panel; Future    TSH, 3rd generation with Free T4 reflex; Future

## 2025-07-08 NOTE — TELEPHONE ENCOUNTER
PA for Diazepam (VALIUM) 5 mg tablet SUBMITTED to [facility]    via    []CMM-KEY:   [x]Surescripts-Case ID #: [id number]   []Availity-Auth ID #   []Faxed to plan   []Other website   []Phone call Case ID #     [x]PA sent as URGENT    All office notes, labs and other pertaining documents and studies sent. Clinical questions answered. Awaiting determination from insurance company.     Turnaround time for your insurance to make a decision on your Prior Authorization can take 7-21 business days.

## 2025-07-08 NOTE — ASSESSMENT & PLAN NOTE
Requesting renewal on viagra  Orders:    sildenafil (VIAGRA) 100 mg tablet; Take 1 tablet (100 mg total) by mouth daily as needed for erectile dysfunction

## 2025-07-09 NOTE — TELEPHONE ENCOUNTER
PA for Diazepam (VALIUM) 5 mg tablet APPROVED     Date(s) approved  January 1, 2025 to August 7, 2025     Case #: [id number]      Patient advised by          []Sylvan Sourcehart Message  [x]Phone call   [x]LMOM  []L/M to call office as no active Communication consent on file  []Unable to leave detailed message as VM not approved on Communication consent       Pharmacy advised by    []Fax  [x]Phone call- Paid claim received- Ready for   []Secure Chat       Approval letter scanned into Media Yes

## 2025-07-17 ENCOUNTER — TELEPHONE (OUTPATIENT)
Age: 70
End: 2025-07-17

## 2025-08-04 ENCOUNTER — TELEPHONE (OUTPATIENT)
Dept: FAMILY MEDICINE CLINIC | Facility: CLINIC | Age: 70
End: 2025-08-04

## 2025-08-06 PROBLEM — Z00.00 MEDICARE ANNUAL WELLNESS VISIT, SUBSEQUENT: Status: RESOLVED | Noted: 2020-07-16 | Resolved: 2025-08-06

## 2025-08-07 ENCOUNTER — CLINICAL SUPPORT (OUTPATIENT)
Dept: FAMILY MEDICINE CLINIC | Facility: CLINIC | Age: 70
End: 2025-08-07

## 2025-08-07 ENCOUNTER — TELEPHONE (OUTPATIENT)
Dept: FAMILY MEDICINE CLINIC | Facility: CLINIC | Age: 70
End: 2025-08-07

## 2025-08-07 DIAGNOSIS — F41.9 ANXIETY: ICD-10-CM

## 2025-08-07 DIAGNOSIS — R53.82 CHRONIC FATIGUE: ICD-10-CM

## 2025-08-07 DIAGNOSIS — R35.0 URINARY FREQUENCY: ICD-10-CM

## 2025-08-07 DIAGNOSIS — E78.1 HYPERTRIGLYCERIDEMIA: ICD-10-CM

## 2025-08-08 DIAGNOSIS — M25.561 CHRONIC PAIN OF RIGHT KNEE: Primary | ICD-10-CM

## 2025-08-08 DIAGNOSIS — G89.29 CHRONIC PAIN OF RIGHT KNEE: Primary | ICD-10-CM

## 2025-08-08 LAB
ALBUMIN SERPL-MCNC: 4.1 G/DL (ref 3.6–5.1)
ALBUMIN/GLOB SERPL: 1.4 (CALC) (ref 1–2.5)
ALP SERPL-CCNC: 56 U/L (ref 35–144)
ALT SERPL-CCNC: 16 U/L (ref 9–46)
AST SERPL-CCNC: 19 U/L (ref 10–35)
BASOPHILS # BLD AUTO: 41 CELLS/UL (ref 0–200)
BASOPHILS NFR BLD AUTO: 0.6 %
BILIRUB SERPL-MCNC: 0.6 MG/DL (ref 0.2–1.2)
BUN SERPL-MCNC: 20 MG/DL (ref 7–25)
BUN/CREAT SERPL: NORMAL (CALC) (ref 6–22)
CALCIUM SERPL-MCNC: 8.9 MG/DL (ref 8.6–10.3)
CHLORIDE SERPL-SCNC: 105 MMOL/L (ref 98–110)
CHOLEST SERPL-MCNC: 252 MG/DL
CHOLEST/HDLC SERPL: 4.8 (CALC)
CO2 SERPL-SCNC: 22 MMOL/L (ref 20–32)
CREAT SERPL-MCNC: 1.02 MG/DL (ref 0.7–1.28)
EOSINOPHIL # BLD AUTO: 231 CELLS/UL (ref 15–500)
EOSINOPHIL NFR BLD AUTO: 3.4 %
ERYTHROCYTE [DISTWIDTH] IN BLOOD BY AUTOMATED COUNT: 12.6 % (ref 11–15)
GFR/BSA.PRED SERPLBLD CYS-BASED-ARV: 79 ML/MIN/1.73M2
GLOBULIN SER CALC-MCNC: 2.9 G/DL (CALC) (ref 1.9–3.7)
GLUCOSE SERPL-MCNC: 90 MG/DL (ref 65–99)
HCT VFR BLD AUTO: 42.8 % (ref 38.5–50)
HDLC SERPL-MCNC: 53 MG/DL
HGB BLD-MCNC: 14.5 G/DL (ref 13.2–17.1)
LDLC SERPL CALC-MCNC: 178 MG/DL (CALC)
LYMPHOCYTES # BLD AUTO: 1367 CELLS/UL (ref 850–3900)
LYMPHOCYTES NFR BLD AUTO: 20.1 %
MCH RBC QN AUTO: 33.6 PG (ref 27–33)
MCHC RBC AUTO-ENTMCNC: 33.9 G/DL (ref 32–36)
MCV RBC AUTO: 99.1 FL (ref 80–100)
MONOCYTES # BLD AUTO: 755 CELLS/UL (ref 200–950)
MONOCYTES NFR BLD AUTO: 11.1 %
NEUTROPHILS # BLD AUTO: 4406 CELLS/UL (ref 1500–7800)
NEUTROPHILS NFR BLD AUTO: 64.8 %
NONHDLC SERPL-MCNC: 199 MG/DL (CALC)
PLATELET # BLD AUTO: 204 THOUSAND/UL (ref 140–400)
PMV BLD REES-ECKER: 12.4 FL (ref 7.5–12.5)
POTASSIUM SERPL-SCNC: 4.6 MMOL/L (ref 3.5–5.3)
PROT SERPL-MCNC: 7 G/DL (ref 6.1–8.1)
PSA FREE MFR SERPL: 6 % (CALC)
PSA FREE SERPL-MCNC: 0.3 NG/ML
PSA SERPL-MCNC: 5 NG/ML
RBC # BLD AUTO: 4.32 MILLION/UL (ref 4.2–5.8)
SODIUM SERPL-SCNC: 138 MMOL/L (ref 135–146)
TRIGL SERPL-MCNC: 96 MG/DL
TSH SERPL-ACNC: 3.64 MIU/L (ref 0.4–4.5)
WBC # BLD AUTO: 6.8 THOUSAND/UL (ref 3.8–10.8)

## 2025-08-19 ENCOUNTER — EVALUATION (OUTPATIENT)
Dept: PHYSICAL THERAPY | Facility: CLINIC | Age: 70
End: 2025-08-19
Attending: FAMILY MEDICINE
Payer: COMMERCIAL

## 2025-08-19 DIAGNOSIS — M25.561 CHRONIC PAIN OF RIGHT KNEE: ICD-10-CM

## 2025-08-19 DIAGNOSIS — G89.29 CHRONIC PAIN OF RIGHT KNEE: ICD-10-CM

## 2025-08-19 PROCEDURE — 97110 THERAPEUTIC EXERCISES: CPT

## 2025-08-19 PROCEDURE — 97161 PT EVAL LOW COMPLEX 20 MIN: CPT
